# Patient Record
Sex: FEMALE | Race: ASIAN | URBAN - METROPOLITAN AREA
[De-identification: names, ages, dates, MRNs, and addresses within clinical notes are randomized per-mention and may not be internally consistent; named-entity substitution may affect disease eponyms.]

---

## 2017-01-05 ENCOUNTER — TELEPHONE (OUTPATIENT)
Dept: FAMILY MEDICINE | Facility: CLINIC | Age: 57
End: 2017-01-05

## 2017-01-05 NOTE — TELEPHONE ENCOUNTER
Prior Authorization Retail Medication Request  Medication/Dose: hydrocortisone (ANUSOL-HC) 25 MG suppository (Hydrocortisone 25mg supp)  Diagnosis and ICD code: External hemorrhoids [K64.4]   New/Renewal/Insurance Change PA:   Previously Tried and Failed Therapies:     Insurance ID (if provided): [id number] - [employer]  Insurance Phone (if provided): [phone]    Any additional info from fax request:   BIN  [id number]  [facility]  Group  [id number]    If you received a fax notification from an outside Pharmacy:  Pharmacy Name:[facility] [id number]  Pharmacy #:[phone]  Pharmacy Fax:[phone]

## 2017-02-10 DIAGNOSIS — K59.09 CHRONIC CONSTIPATION: Primary | ICD-10-CM

## 2017-02-10 RX ORDER — ASPIRIN 81 MG
100 TABLET, DELAYED RELEASE (ENTERIC COATED) ORAL 3 TIMES DAILY PRN
Qty: 90 TABLET | Refills: 8 | Status: SHIPPED | OUTPATIENT
Start: 2017-02-10 | End: 2017-08-08

## 2017-02-10 NOTE — TELEPHONE ENCOUNTER
Date of last visit at clinic: 11/08/2016    Please complete refill and CLOSE ENCOUNTER.  Closing the encounter signifies the refill is complete.

## 2017-03-14 NOTE — TELEPHONE ENCOUNTER
Prior Authorization: Approved    Approved as of: 03/08/2017 through 03/08/2018    Pharmacy notified.  Routing to MD Alexandrea Chavis March 14, 2017 at 9:28 AM

## 2017-07-05 DIAGNOSIS — R09.81 NASAL CONGESTION: ICD-10-CM

## 2017-07-05 RX ORDER — FLUTICASONE PROPIONATE 50 MCG
1-2 SPRAY, SUSPENSION (ML) NASAL DAILY
Qty: 16 G | Refills: 1 | Status: SHIPPED | OUTPATIENT
Start: 2017-07-05 | End: 2018-03-30

## 2017-07-05 NOTE — TELEPHONE ENCOUNTER
Date of last visit at clinic: 11/8/16    Please complete refill and CLOSE ENCOUNTER.  Closing the encounter signifies the refill is complete.

## 2017-07-20 ENCOUNTER — TRANSFERRED RECORDS (OUTPATIENT)
Dept: HEALTH INFORMATION MANAGEMENT | Facility: CLINIC | Age: 57
End: 2017-07-20

## 2017-08-08 ENCOUNTER — OFFICE VISIT (OUTPATIENT)
Dept: FAMILY MEDICINE | Facility: CLINIC | Age: 57
End: 2017-08-08

## 2017-08-08 ENCOUNTER — OFFICE VISIT (OUTPATIENT)
Dept: PHARMACY | Facility: CLINIC | Age: 57
End: 2017-08-08

## 2017-08-08 VITALS
SYSTOLIC BLOOD PRESSURE: 96 MMHG | RESPIRATION RATE: 16 BRPM | OXYGEN SATURATION: 98 % | TEMPERATURE: 97.6 F | HEIGHT: 62 IN | HEART RATE: 78 BPM | DIASTOLIC BLOOD PRESSURE: 65 MMHG | BODY MASS INDEX: 17.48 KG/M2 | WEIGHT: 95 LBS

## 2017-08-08 DIAGNOSIS — R63.6 UNDERWEIGHT: ICD-10-CM

## 2017-08-08 DIAGNOSIS — G47.00 INSOMNIA, UNSPECIFIED TYPE: ICD-10-CM

## 2017-08-08 DIAGNOSIS — K59.09 CHRONIC CONSTIPATION: ICD-10-CM

## 2017-08-08 DIAGNOSIS — K64.4 EXTERNAL HEMORRHOIDS: ICD-10-CM

## 2017-08-08 DIAGNOSIS — Z00.00 ROUTINE GENERAL MEDICAL EXAMINATION AT A HEALTH CARE FACILITY: ICD-10-CM

## 2017-08-08 DIAGNOSIS — E55.9 VITAMIN D DEFICIENCY: Primary | ICD-10-CM

## 2017-08-08 DIAGNOSIS — Z78.0 POSTMENOPAUSAL STATUS: ICD-10-CM

## 2017-08-08 DIAGNOSIS — H04.123 DRY EYES: Primary | ICD-10-CM

## 2017-08-08 LAB
HBA1C MFR BLD: 4.7 % (ref 4.1–5.7)
TSH SERPL DL<=0.005 MIU/L-ACNC: 1.32 MU/L (ref 0.4–4)

## 2017-08-08 RX ORDER — TRAZODONE HYDROCHLORIDE 50 MG/1
50 TABLET, FILM COATED ORAL
Qty: 30 TABLET | Refills: 3 | Status: SHIPPED | OUTPATIENT
Start: 2017-08-08 | End: 2018-10-25

## 2017-08-08 RX ORDER — ASPIRIN 81 MG
100 TABLET, DELAYED RELEASE (ENTERIC COATED) ORAL 2 TIMES DAILY PRN
Qty: 60 TABLET | Refills: 11 | Status: SHIPPED | OUTPATIENT
Start: 2017-08-08 | End: 2018-10-25

## 2017-08-08 RX ORDER — HYDROCORTISONE ACETATE 25 MG/1
25 SUPPOSITORY RECTAL 2 TIMES DAILY PRN
Qty: 28 SUPPOSITORY | Refills: 3 | Status: SHIPPED | OUTPATIENT
Start: 2017-08-08 | End: 2018-10-25

## 2017-08-08 RX ORDER — CARBOXYMETHYLCELLULOSE SODIUM 5 MG/ML
1 SOLUTION/ DROPS OPHTHALMIC DAILY PRN
Qty: 1 BOTTLE | Refills: 11 | Status: SHIPPED | OUTPATIENT
Start: 2017-08-08 | End: 2019-04-18

## 2017-08-08 RX ORDER — HYDROCORTISONE 2.5 %
CREAM (GRAM) TOPICAL
COMMUNITY
End: 2021-10-13

## 2017-08-08 NOTE — LETTER
August 9, 2017      Bridgette Carl  [address] [place] [address] [address] [place] [id number]        Dear Bridgette,    Thank you for getting your care at [facility]. Please see below for your test results.    Resulted Orders   Vitamin D Deficiency   Result Value Ref Range    Vitamin D Deficiency screening 8 (L) 20 - 75 ug/L      Comment:      Season, race, dietary intake, and treatment affect the concentration of   25-hydroxy-Vitamin D. Values may decrease during winter months and increase   during summer months. Values 20-29 ug/L may indicate Vitamin D insufficiency   and values <20 ug/L may indicate Vitamin D deficiency.   Vitamin D determination is routinely performed by an immunoassay specific for   25 hydroxyvitamin D3.  If an individual is on vitamin D2 (ergocalciferol)   supplementation, please specify 25 OH vitamin D2 and D3 level determination   by   LCMSMS test VITD23.     Hemoglobin A1c (LabDAQ)   Result Value Ref Range    Hemoglobin A1C 4.7 4.1 - 5.7 %   TSH with free T4 reflex   Result Value Ref Range    TSH 1.32 0.40 - 4.00 mU/L       Your test results are good except your vitamin D level is very low. I have sent a prescription to your pharmacy for high dose vitamin D that you should take every week for 16 weeks.    Sincerely,    Erin Grant MD

## 2017-08-08 NOTE — NURSING NOTE
used this visit:  Name: Paco Dang  Language: Cantonease  Agency: [facility]   Phone:[phone]  Radha Gudino

## 2017-08-08 NOTE — MR AVS SNAPSHOT
After Visit Summary   2017    Bridgette Carl    MRN: [id number]           Patient Information     Date Of Birth          1960        Visit Information        Provider Department      2017 2:40 PM Manasa Espinoza, [facility]        Today's Diagnoses     Dry eyes    -  1    Postmenopausal status        Chronic constipation        External hemorrhoids        Insomnia, unspecified type           Follow-ups after your visit        Who to contact     Please call your clinic at [phone] to:    Ask questions about your health    Make or cancel appointments    Discuss your medicines    Learn about your test results    Speak to your doctor   If you have compliments or concerns about an experience at your clinic, or if you wish to file a complaint, please contact [facility] Patient Relations at [phone] or email us at [email or web address]         Additional Information About Your Visit        MyChart Information     Newsana is an electronic gateway that provides easy, online access to your medical records. With Newsana, you can request a clinic appointment, read your test results, renew a prescription or communicate with your care team.     To sign up for [employer] visit the website at [email or web address]   You will be asked to enter the access code listed below, as well as some personal information. Please follow the directions to create your username and password.     Your access code is: [email or web address]  Expires: 2017  3:37 PM     Your access code will  in 90 days. If you need help or a new code, please contact your [facility] or call [phone] for assistance.        Care EveryWhere ID     This is your Care EveryWhere ID. This could be used by other organizations to access your [place] medical records  [id number]         Blood Pressure from Last 3 Encounters:   17 96/65    11/08/16 103/67   10/17/16 98/68    Weight from Last 3 Encounters:   08/08/17 95 lb (43.1 kg)   11/08/16 93 lb 9.6 oz (42.5 kg)   10/17/16 96 lb (43.5 kg)              Today, you had the following     No orders found for display         Today's Medication Changes          These changes are accurate as of: 8/8/17 11:59 PM.  If you have any questions, ask your nurse or doctor.               Start taking these medicines.        Dose/Directions    carboxymethylcellulose 0.5 % Soln ophthalmic solution   Commonly known as:  REFRESH PLUS   Used for:  Dry eyes   Started by:  Manasa Espinoza RPH        Dose:  1 drop   Place 1 drop into both eyes daily as needed for dry eyes   Quantity:  1 Bottle   Refills:  11         These medicines have changed or have updated prescriptions.        Dose/Directions    conjugated estrogens cream   Commonly known as:  PREMARIN   This may have changed:    - how much to take  - how to take this  - when to take this  - additional instructions   Used for:  Postmenopausal status   Changed by:  Manasa Espinoza RPH        Dose:  0.5 g   Place 0.5 g vaginally twice a week As needed for vaginal dryness.   Quantity:  30 g   Refills:  3       docusate sodium 100 MG tablet   Commonly known as:  COLACE   This may have changed:  when to take this   Used for:  Chronic constipation   Changed by:  Manasa Espinoza RPH        Dose:  100 mg   Take 100 mg by mouth 2 times daily as needed for constipation   Quantity:  60 tablet   Refills:  11       hydrocortisone 25 MG Suppository   Commonly known as:  ANUSOL-HC   This may have changed:    - when to take this  - reasons to take this   Used for:  External hemorrhoids   Changed by:  Manasa Espinoza RPH        Dose:  25 mg   Place 1 suppository (25 mg) rectally 2 times daily as needed for hemorrhoids   Quantity:  28 suppository   Refills:  3       traZODone 50 MG tablet   Commonly known as:  DESYREL   This may have changed:    - how much to take  - how to  take this  - when to take this  - reasons to take this  - additional instructions   Used for:  Insomnia, unspecified type   Changed by:  Manasa Espinoza, [facility]        Dose:  50 mg   Take 1 tablet (50 mg) by mouth nightly as needed for sleep   Quantity:  30 tablet   Refills:  3            Where to get your medicines      These medications were sent to [facility] - [address]  [address] [facility] [address]     Phone:  [phone]     carboxymethylcellulose 0.5 % Soln ophthalmic solution    conjugated estrogens cream    docusate sodium 100 MG tablet    hydrocortisone 25 MG Suppository    traZODone 50 MG tablet                Primary Care Provider Office Phone # Fax #    Erin Grant [phone]       [address] [address]        Equal Access to Services     [place] : Amanda fonseca Socaridad, waaxda luqadaha, qaybta kaalmada myesha, dee dee acevedo . So [facility] [phone].    ATENCIÓN: Si habla español, tiene a carvajal disposición servicios gratuitos de asistencia lingüística. Ly al [phone].    We comply with applicable federal civil rights laws and [place] laws. We do not discriminate on the basis of race, color, national origin, age, disability sex, sexual orientation or gender identity.            Thank you!     Thank you for choosing [facility]  for your care. Our goal is always to provide you with excellent care. Hearing back from our patients is one way we can continue to improve our services. Please take a few minutes to complete the written survey that you may receive in the mail after your visit with us. Thank you!             Your Updated Medication List - Protect others around you: Learn how to safely use, store and throw away your medicines at www.disposemymeds.org.          This list is accurate as of: 8/8/17 11:59 PM.  Always use your most recent med  list.                   Brand Name Dispense Instructions for use Diagnosis    acetaminophen 500 MG tablet    TYLENOL    100 tablet    Take 1-2 tablets (500-1,000 mg) by mouth every 6 hours as needed for mild pain    Scoliosis (and kyphoscoliosis), idiopathic       calcium carbonate-vitamin D 500-400 MG-UNIT Tabs per tablet     180 tablet    Take 1 tablet by mouth 2 times daily    Routine general medical examination at a health care facility       carboxymethylcellulose 0.5 % Soln ophthalmic solution    REFRESH PLUS    1 Bottle    Place 1 drop into both eyes daily as needed for dry eyes    Dry eyes       conjugated estrogens cream    PREMARIN    30 g    Place 0.5 g vaginally twice a week As needed for vaginal dryness.    Postmenopausal status       diphenhydrAMINE 25 MG tablet    BENADRYL    60 tablet    Take 1-2 tablets (25-50 mg) by mouth every 6 hours as needed for itching or allergies    Dry skin       docusate sodium 100 MG tablet    COLACE    60 tablet    Take 100 mg by mouth 2 times daily as needed for constipation    Chronic constipation       eucerin cream     240 g    Apply topically as needed for dry skin or itching    Dry skin       fluticasone 50 MCG/ACT spray    FLONASE    16 g    Spray 1-2 sprays into both nostrils daily    Nasal congestion       * hydrocortisone 2.5 % cream      Apply topically every night before bed for hemorrhoids        * hydrocortisone 1 % lotion     113 mL    Apply topically 2 times daily    Dermatitis       hydrocortisone 25 MG Suppository    ANUSOL-HC    28 suppository    Place 1 suppository (25 mg) rectally 2 times daily as needed for hemorrhoids    External hemorrhoids       olopatadine 0.1 % ophthalmic solution    PATANOL    1 Bottle    Place 1 drop into both eyes 2 times daily    Chronic allergic conjunctivitis       order for DME     1 Units    Equipment being ordered: donut cushion    Sacral pain       traZODone 50 MG tablet    DESYREL    30 tablet    Take 1 tablet (50  mg) by mouth nightly as needed for sleep    Insomnia, unspecified type       Witch Hazel Pads     1 each    Use prn bowel movements    Constipation       * Notice:  This list has 2 medication(s) that are the same as other medications prescribed for you. Read the directions carefully, and ask your doctor or other care provider to review them with you.

## 2017-08-08 NOTE — PROGRESS NOTES
Clinical Pharmacy Note     Bridgette was referred by Dr. Grant for pharmacy services for MTM.    MEDICATION REVIEW:  Discussed all medication indications, dosage and effectiveness, adverse effects, and adherence with patient/caregiver.    Pt had meds with them: no  Pt had med list with them: no  Pt was knowledgeable about meds: no, sister Keyla was knowledgeable about medications.  Medications set up by: Keyla goel  Medications administered by someone else (e.g., LTCF): Yes: Keyla goel    Medication Discrepancies  Medications on EMR med list that pt is NOT taking:  yes, guaifenesin and codeine, citrucel, oxybutynin, piroxicam, calcium + vitamin D  Medications pt IS taking that are NOT on EMR med list (e.g., from specialist, hospital): yes, using proctozone-hc for hemorrhoids.  OTC meds/ dietary supplements pt taking on own that are NOT on EMR med list:  none  Dosage listed differently than how patient is taking: yes, trazodone - taking 1/2 tablet some days and 1 tablet other days  Frequency listed differently than how patient is taking: docusate - patient is taking twice daily, not 3 times.  Duplicate medication on list (two occurrences of the same medication):  none  TOTAL NUMBER OF MEDICATION DISCREPANCIES:  9  Medications addressed: 14  ______________________________________________________________________    Subjective:  Bridgette is here today for medication refills and with questions about her medications.  She brings with her May, her sister who communicates for her.  A  was also used for the visit.    1)   Insomnia;     Patient states that the trazodone is not always working to help her fall asleep.  She states that the directions are to take 1/2 tablet daily but that she has increased this to 1 full tablet (maybe 4 days/week) which has been working better.  When asked about the time of administration, May states that she gives her the medication sometimes at 8p, sometimes 9p and sometimes 11p -  "pretty inconsistent.  May eats dinner around 6pm and goes to sleep around 12midnight regularly.    2)  Postmenopausal Status Bone Health;    Patient decided not to take her calcium + vitamin D supplement anymore.  When asked why she doesn't take it, May states \"because she does not make decisions like a normal person.\"  The patient also states that she is concerned about getting a kidney stone if she uses calcium supplements.  She denies history of kidney stones.    When asked about dietary calcium intake, patient states that she does not drink milk very often but that she could try to drink more and she also likes yogurt.  States she might be getting calcium through spinach.    May asks about using just a vitamin D supplement and getting calcium through the diet.    3)   Dry Eyes    Patient is using patanol twice daily almost every day and is requesting a refill.  When asked about allergens, she first denies allergens and states her eyes are red and dry.  Sometimes when she goes outside her eyes are irritated.      She notes that her eyes are also dry when she is in the house.  She denies any pets in the home.      Patient requests refills of docusate, premarin and hct suppositories.  Patient reported being compliant most of the time   Patient reports no side effects.    Objective:    No Ca or Vitamin D level since 2014  Vitamin D <13  Ca 9.7 (RR 8.5 to 10.4)    There were no vitals taken for this visit.  BP Readings from Last 6 Encounters:   08/08/17 96/65   11/08/16 103/67   10/17/16 98/68   06/03/16 98/64   05/05/16 104/61   04/06/16 116/75     CrCl cannot be calculated (Patient's most recent sCr result is older than the maximum 90 days allowed.).  GFR Estimate   Date Value Ref Range Status   06/13/2014 >90 >60 mL/min/1.7m2 Final   10/10/2013 >90 >60 mL/min/1.7m2 Final     GFR Estimate If Black   Date Value Ref Range Status   06/13/2014 >90 >60 mL/min/1.7m2 Final   10/10/2013 >90 >60 mL/min/1.7m2 Final     Drug " Therapy Assessment:  Drug therapy problems identified:     1. Insomnia, unspecified type       Status:  uncontrolled       DTP:  Dosage Too Low: dose too low  , DTP degree:2            Patient is experiencing continued insomnia with trazodone 25mg dose.  It is appropriate to increase the dose to 50 mg nightly as needed at this time.  Administration has not been with food and is recommended to remain without food to optimize peak effect within 1 hour.  Timing of administration of medication had been inconsistent in relation to bedtime.  Recommend to wait to give dose until 30-60 minutes before intentions of going to sleep.    Prescription was sent:  - traZODone (DESYREL) 50 MG tablet; Take 1 tablet (50 mg) by mouth nightly as needed for sleep  Dispense: 30 tablet; Refill: 3    2. Postmenopausal status/Bone Health       Status:  Uncontrolled, patient not meeting recommended Ca daily intake.       DTP:  patient prefers not to take  , DTP degree:2            Bridgette has decided to stop taking her Calcium + Vitamin D supplement and she does not regularly eat dairy products.  Her dietary calcium intake is probably ~250 mg/day from non-dairy food sources.  She is not meeting daily intake recommendations for National Osteoporosis Foundation of 1200 mg Ca/800 IU Vitamin D daily for post-menopausal women age 50+.      She is resistant to start taking a supplement again because of fear of kidney stones.  Patient does not have other medical conditions that are potential risk factors for kidney stones such as obesity, diabetes or gout.  Patient does not have a history of kidney stones and I am not concerned about her being high risk.  There may be an association between calcium supplements and increased risk of kidney stones, but a diet high in calcium has been shown to have the opposite effect.  Because patient is unwilling to take the supplement at this time, recommend increasing dietary calcium.    We had a discussion about the  recommendation to increase dietary calcium intake: eat more dairy products such as cheese, yogurt and milk and continue to eat spinach.  Patient could supplement with Vitamin D alone rather than with the combo tablet.    Recommend draw calcium and vitamin D labs to assess patient current state.  Consider initiating Vitamin D 800 IU/day based on lab results.    3. Dry eyes       Status:  uncontrolled       DTP:  Needs Additional Therapy: synergistic therapy  , DTP degree:2    Patient seems to be experiencing dry irritated eyes.  She does have a history of cataracts.  Patient is currently using patanol for dry, red irritated eyes.  It is unclear whether the irritation is allergic related or simply dry eyes.  It is appropriate to trial using artificial tears at this time because the irritation may be dry eyes, rather than a histamine-mediated process.  If redness, itching and irritation continue, then patanol is appropriate to continue.  - carboxymethylcellulose (REFRESH PLUS) 0.5 % SOLN ophthalmic solution; Place 1 drop into both eyes daily as needed for dry eyes  Dispense: 1 Bottle; Refill: 11      Refill prescription doses and instructions were verified with Dr. Grant  - hydrocortisone (ANUSOL-HC) 25 MG Suppository; Place 1 suppository (25 mg) rectally 2 times daily as needed for hemorrhoids  Dispense: 28 suppository; Refill: 3  - conjugated estrogens (PREMARIN) cream; Place 0.5 g vaginally twice a week As needed for vaginal dryness.  Dispense: 30 g; Refill: 3.  - docusate sodium (COLACE) 100 MG tablet; Take 100 mg by mouth 2 times daily as needed for constipation  Dispense: 60 tablet; Refill: 11    All medications were reviewed and found to be indicated, effective, safe and convenient unless drug therapy problem identified as described above.        Drug Therapy Plan and Follow up:    Plan    1. Start taking trazodone 50 mg at bedtime as needed for insomnia.  2. Increase dietary calcium with dairy  products.  3. Draw calcium and vitamin D labs.  4. Trial artificial tears when eyes are dry.    Follow up: with PCP  Patient was provided with written instructions/medication list via provider AVS.    Options for treatment and/or follow-up care were reviewed with the patient. Patient was engaged and actively involved in the decision making process.  Bridgette Carl verbalized understanding of the options discussed and was satisfied with the final plan.       Dr. Grant was provided my action  in clinic today and Dr. Grant was available for supervision during this visit and is the authorizing prescriber for this visit through the pharmacist collaborative practice agreement.    Manasa Espinoza, Pharm.D      Total Time: 30  # DTPs Identified: 3    Medical Condition 1: Insomnia, Goals of therapy: Not at goal, Drug Class: Antidepressant,  , Efficacy: Partially effective,  ,  , Intervention: Change dose, Verification: Patient Agreed - CPA  Medical Condition 2: Other vitamin deficiencies, Goals of therapy 2: Other, Drug Class 2: Vitamins,  ,  ,  , Convenience 2: Patient prefers not to take, Intervention 2: Educate patient, Verification 2: Patient Agreed - CPA  Medical Condition 3: Other, Goals of therapy 3: Not at goal, Drug Class 3: Other, Indication 3: Needs additional drug therapy,  ,  ,  , Intervention 3: Initiate drug, Verification 3: Patient Agreed - CPA   ,  ,  ,

## 2017-08-08 NOTE — MR AVS SNAPSHOT
After Visit Summary   2017    Bridgette Carl    MRN: [id number]           Patient Information     Date Of Birth          1960        Visit Information        Provider Department      2017 2:20 PM Erin Grant MD [facility]        Today's Diagnoses     Vitamin D deficiency    -  1    Routine general medical examination at a health care facility        Underweight           Follow-ups after your visit        Who to contact     Please call your clinic at [phone] to:    Ask questions about your health    Make or cancel appointments    Discuss your medicines    Learn about your test results    Speak to your doctor   If you have compliments or concerns about an experience at your clinic, or if you wish to file a complaint, please contact [facility] Patient Relations at [phone] or email us at [email or web address]         Additional Information About Your Visit        MyChart Information     [employer] is an electronic gateway that provides easy, online access to your medical records. With [employer], you can request a clinic appointment, read your test results, renew a prescription or communicate with your care team.     To sign up for Leetchit visit the website at [email or web address]   You will be asked to enter the access code listed below, as well as some personal information. Please follow the directions to create your username and password.     Your access code is: [email or web address]  Expires: 2017  3:37 PM     Your access code will  in 90 days. If you need help or a new code, please contact your [facility] or call [phone] for assistance.        Care EveryWhere ID     This is your Care EveryWhere ID. This could be used by other organizations to access your [place] medical records  [id number]        Your Vitals Were     Pulse Temperature Respirations Height Pulse Oximetry  "BMI (Body Mass Index)    78 97.6  F (36.4  C) (Oral) 16 5' 1.54\" (156.3 cm) 98% 17.64 kg/m2       Blood Pressure from Last 3 Encounters:   08/08/17 96/65   11/08/16 103/67   10/17/16 98/68    Weight from Last 3 Encounters:   08/08/17 95 lb (43.1 kg)   11/08/16 93 lb 9.6 oz (42.5 kg)   10/17/16 96 lb (43.5 kg)              We Performed the Following     Hemoglobin A1c (LabDAQ)     TSH with free T4 reflex     Vitamin D Deficiency          Today's Medication Changes          These changes are accurate as of: 8/8/17  3:40 PM.  If you have any questions, ask your nurse or doctor.               These medicines have changed or have updated prescriptions.        Dose/Directions    docusate sodium 100 MG tablet   Commonly known as:  COLACE   This may have changed:  additional instructions   Used for:  Chronic constipation        Dose:  100 mg   Take 100 mg by mouth 3 times daily as needed for constipation   Quantity:  90 tablet   Refills:  8                Primary Care Provider Office Phone # Fax #    Erin Grant [phone]       [address] [address]        Equal Access to Services     [place] [facility]: Amanda acuñao Susie, watresda luamena, qaybta kaalmada adeopalda, dee dee johnson. So [facility] [phone].    ATENCIÓN: Si habla español, tiene a carvajal disposición servicios gratuitos de asistencia lingüística. Llcherri al [phone].    We comply with applicable federal civil rights laws and [place] laws. We do not discriminate on the basis of race, color, national origin, age, disability sex, sexual orientation or gender identity.            Thank you!     [facility] you for [facility]  for your care. Our goal is always to provide you with excellent care. Hearing back from our patients is one way we can continue to improve our services. Please take a few minutes to complete the written survey that you may receive " in the mail after your visit with us. Thank you!             Your Updated Medication List - Protect others around you: Learn how to safely use, store and throw away your medicines at www.disposemymeds.org.          This list is accurate as of: 8/8/17  3:40 PM.  Always use your most recent med list.                   Brand Name Dispense Instructions for use Diagnosis    acetaminophen 500 MG tablet    TYLENOL    100 tablet    Take 1-2 tablets (500-1,000 mg) by mouth every 6 hours as needed for mild pain    Scoliosis (and kyphoscoliosis), idiopathic       calcium carbonate-vitamin D 500-400 MG-UNIT Tabs per tablet     180 tablet    Take 1 tablet by mouth 2 times daily    Routine general medical examination at a health care facility       conjugated estrogens cream    PREMARIN    30 g    Use as needed for vaginal dryness and itching    Postmenopausal status       diphenhydrAMINE 25 MG tablet    BENADRYL    60 tablet    Take 1-2 tablets (25-50 mg) by mouth every 6 hours as needed for itching or allergies    Dry skin       docusate sodium 100 MG tablet    COLACE    90 tablet    Take 100 mg by mouth 3 times daily as needed for constipation    Chronic constipation       eucerin cream     240 g    Apply topically as needed for dry skin or itching    Dry skin       fluticasone 50 MCG/ACT spray    FLONASE    16 g    Spray 1-2 sprays into both nostrils daily    Nasal congestion       * hydrocortisone 2.5 % cream      Apply topically every night before bed for hemorrhoids        * hydrocortisone 1 % lotion     113 mL    Apply topically 2 times daily    Dermatitis       hydrocortisone 25 MG Suppository    ANUSOL-HC    28 suppository    Place 1 suppository (25 mg) rectally 2 times daily    External hemorrhoids       olopatadine 0.1 % ophthalmic solution    PATANOL    1 Bottle    Place 1 drop into both eyes 2 times daily    Chronic allergic conjunctivitis       order for DME     1 Units    Equipment being ordered: donut cushion     Sacral pain       traZODone 50 MG tablet    DESYREL    45 tablet    Take 1/2 tablet as needed at bedtime    Insomnia, unspecified       Witch Hazel Pads     1 each    Use prn bowel movements    Constipation       * Notice:  This list has 2 medication(s) that are the same as other medications prescribed for you. Read the directions carefully, and ask your doctor or other care provider to review them with you.

## 2017-08-09 DIAGNOSIS — E55.9 VITAMIN D DEFICIENCY: Primary | ICD-10-CM

## 2017-08-09 LAB — DEPRECATED CALCIDIOL+CALCIFEROL SERPL-MC: 8 UG/L (ref 20–75)

## 2017-08-09 NOTE — PROGRESS NOTES
SUBJECTIVE:  The patient is here for a routine exam.  We went through the healthcare maintenance items.  The only thing she is due for is a mammogram, but she declines that.  She has never been sexually active, so we have not done Pap and pelvic exams with her, either.  She did fill out the health history form.  With review of systems, the 20+ items on the form, she did have things positive for change in bowel habits, pain in the muscles and joints, numbness in the legs and bleeding; I think that is with her hemorrhoids.  Actually, her problems are all actually very stable as long as she takes her medications.  Probably the biggest thing has been the hemorrhoids, and she has seen Colorectal Surgery about those, and again as long she takes her meds, she is okay.  The patient is accompanied by her sister and an .  I had PharmD see them since there were a lot of issues related to the meds.  They wanted to switch her pharmacy to [facility].  The sister wanted some blood tests checked.  She felt that her sister maybe had lost a little bit of weight and maybe was a little bit more tired.  Looking in the chart, there really was not a sign of weight loss.  She has been low on vitamin D in the past, so I wanted to check that.  Because of the sister's concern, I did go ahead and check a TSH.  There had been some question about her being able to get Anusol suppositories, which they felt worked the best.  She did have a prior authorization for this that went through 03/2018.      OBJECTIVE:  On exam, the patient is in no acute distress.  Vital signs are stable.  TMs were normal.  Pupils equally round and reactive to light.  Fundi benign.  Pharynx unremarkable.  No cervical adenopathy.  Heart has a regular rate and rhythm without murmurs.  Lungs are clear.  Breasts without masses or adenopathy.  Abdomen is soft and nontender without masses.  Patellar reflexes are symmetric.      IMPRESSION:  Stable medical  problems.      PLAN:  I checked vitamin D and TSH reflex.  The pharmacy talked with the patient about moving the meds over and refilling what needed to be done.

## 2017-08-11 NOTE — PROGRESS NOTES
===================    Pharmacy Attestation Statement:    Patient s case reviewed. I agree with the written assessment and plan of care.    Luis Miguel Herron PharmD.    ===================

## 2017-12-01 ENCOUNTER — OFFICE VISIT (OUTPATIENT)
Dept: FAMILY MEDICINE | Facility: CLINIC | Age: 57
End: 2017-12-01

## 2017-12-01 VITALS
SYSTOLIC BLOOD PRESSURE: 101 MMHG | OXYGEN SATURATION: 97 % | DIASTOLIC BLOOD PRESSURE: 64 MMHG | TEMPERATURE: 98.8 F | RESPIRATION RATE: 16 BRPM | HEART RATE: 92 BPM | WEIGHT: 94.6 LBS | BODY MASS INDEX: 17.57 KG/M2

## 2017-12-01 DIAGNOSIS — R05.9 COUGH: Primary | ICD-10-CM

## 2017-12-01 RX ORDER — GUAIFENESIN/DEXTROMETHORPHAN 100-10MG/5
5 SYRUP ORAL EVERY 4 HOURS PRN
Qty: 236 ML | Refills: 0 | Status: SHIPPED | OUTPATIENT
Start: 2017-12-01 | End: 2019-07-17

## 2017-12-01 NOTE — MR AVS SNAPSHOT
After Visit Summary   2017    Bridgette Carl    MRN: [id number]           Patient Information     Date Of Birth          1960        Visit Information        Provider Department      2017 10:40 AM Erin Grant MD [facility]        Today's Diagnoses     Cough    -  1       Follow-ups after your visit        Who to contact     Please call your clinic at [phone] to:    Ask questions about your health    Make or cancel appointments    Discuss your medicines    Learn about your test results    Speak to your doctor   If you have compliments or concerns about an experience at your clinic, or if you wish to file a complaint, please contact [facility] Patient Relations at [phone] or email us at [email or web address]         Additional Information About Your Visit        MyChart Information     [employer] is an electronic gateway that provides easy, online access to your medical records. With [employer], you can request a clinic appointment, read your test results, renew a prescription or communicate with your care team.     To sign up for V-Keyt visit the website at [email or web address]   You will be asked to enter the access code listed below, as well as some personal information. Please follow the directions to create your username and password.     Your access code is: [email or web address]  Expires: 3/1/2018 11:01 AM     Your access code will  in 90 days. If you need help or a new code, please contact your [facility] or call [phone] for assistance.        Care EveryWhere ID     This is your Care EveryWhere ID. This could be used by other organizations to access your [place] medical records  [id number]        Your Vitals Were     Pulse Temperature Respirations Pulse Oximetry BMI (Body Mass Index)       92 98.8  F (37.1  C) (Oral) 16 97% 17.57 kg/m2        Blood Pressure from Last 3  Encounters:   12/01/17 101/64   08/08/17 96/65   11/08/16 103/67    Weight from Last 3 Encounters:   12/01/17 94 lb 9.6 oz (42.9 kg)   08/08/17 95 lb (43.1 kg)   11/08/16 93 lb 9.6 oz (42.5 kg)              Today, you had the following     No orders found for display         Today's Medication Changes          These changes are accurate as of: 12/1/17 11:01 AM.  If you have any questions, ask your nurse or doctor.               Start taking these medicines.        Dose/Directions    guaiFENesin-dextromethorphan 100-10 MG/5ML syrup   Commonly known as:  ROBITUSSIN DM   Used for:  Cough   Started by:  Erin Grant MD        Dose:  5 mL   Take 5 mLs by mouth every 4 hours as needed for cough   Quantity:  236 mL   Refills:  0            Where to get your medicines      These medications were sent to [facility] [address] [facility]  [address] [facility] [address]     Phone:  [phone]     guaiFENesin-dextromethorphan 100-10 MG/5ML syrup                Primary Care Provider Office Phone # Fax #    Erin Grant [phone]       [address] [facility] [address] [place] [address]        Equal Access to Services     [place] [facility]: Amanda fonseca Socaridad, waaxda luqadaha, qaybta kaalmada myesha, dee dee acevedo . So [facility] [phone].    ATENCIÓN: Si habla español, tiene a carvajal disposición servicios gratuitos de asistencia lingüística. Ly al [phone].    We comply with applicable federal civil rights laws and [place] laws. We do not discriminate on the basis of race, color, national origin, age, disability, sex, sexual orientation, or gender identity.            Thank you!     Thank you for choosing [facility]  for your care. Our goal is always to provide you with excellent care. Hearing back from our patients is one way we can continue to improve our services. Please take a few minutes to  complete the written survey that you may receive in the mail after your visit with us. Thank you!             Your Updated Medication List - Protect others around you: Learn how to safely use, store and throw away your medicines at www.disposemymeds.org.          This list is accurate as of: 12/1/17 11:01 AM.  Always use your most recent med list.                   Brand Name Dispense Instructions for use Diagnosis    acetaminophen 500 MG tablet    TYLENOL    100 tablet    Take 1-2 tablets (500-1,000 mg) by mouth every 6 hours as needed for mild pain    Scoliosis (and kyphoscoliosis), idiopathic       calcium carbonate-vitamin D 500-400 MG-UNIT Tabs per tablet     180 tablet    Take 1 tablet by mouth 2 times daily    Routine general medical examination at a health care facility       carboxymethylcellulose 0.5 % Soln ophthalmic solution    REFRESH PLUS    1 Bottle    Place 1 drop into both eyes daily as needed for dry eyes    Dry eyes       cholecalciferol 81200 UNITS capsule    VITAMIN D3    16 capsule    Take 1 capsule (50,000 Units) by mouth once a week    Vitamin D deficiency       conjugated estrogens cream    PREMARIN    30 g    Place 0.5 g vaginally twice a week As needed for vaginal dryness.    Postmenopausal status       diphenhydrAMINE 25 MG tablet    BENADRYL    60 tablet    Take 1-2 tablets (25-50 mg) by mouth every 6 hours as needed for itching or allergies    Dry skin       docusate sodium 100 MG tablet    COLACE    60 tablet    Take 100 mg by mouth 2 times daily as needed for constipation    Chronic constipation       eucerin cream     240 g    Apply topically as needed for dry skin or itching    Dry skin       fluticasone 50 MCG/ACT spray    FLONASE    16 g    Spray 1-2 sprays into both nostrils daily    Nasal congestion       guaiFENesin-dextromethorphan 100-10 MG/5ML syrup    ROBITUSSIN DM    236 mL    Take 5 mLs by mouth every 4 hours as needed for cough    Cough       * hydrocortisone 2.5 % cream       Apply topically every night before bed for hemorrhoids        * hydrocortisone 1 % lotion     113 mL    Apply topically 2 times daily    Dermatitis       hydrocortisone 25 MG Suppository    ANUSOL-HC    28 suppository    Place 1 suppository (25 mg) rectally 2 times daily as needed for hemorrhoids    External hemorrhoids       olopatadine 0.1 % ophthalmic solution    PATANOL    1 Bottle    Place 1 drop into both eyes 2 times daily    Chronic allergic conjunctivitis       order for DME     1 Units    Equipment being ordered: donut cushion    Sacral pain       traZODone 50 MG tablet    DESYREL    30 tablet    Take 1 tablet (50 mg) by mouth nightly as needed for sleep    Insomnia, unspecified type       Witch Hazel Pads     1 each    Use prn bowel movements    Constipation       * Notice:  This list has 2 medication(s) that are the same as other medications prescribed for you. Read the directions carefully, and ask your doctor or other care provider to review them with you.

## 2017-12-02 NOTE — PROGRESS NOTES
SUBJECTIVE:  The patient is here with 1 week of a cough and runny nose.  She feels like it is a cold and just is requesting something for the cough.  When she was roomed, it was also mentioned that she was having some right arm pain and needed some kind of letter, but when I saw her, she was really downplaying the right arm pain.  She says it comes and goes, and she just stretches her arm, and it seems to go away.  She also said there was nothing about a form.        OBJECTIVE:  On exam, the patient is in no distress.  Vital signs are stable.  Heart has a regular rate and rhythm without murmurs.  Lungs are clear.      IMPRESSION:  Viral URI.        PLAN:  Reassurance.  I prescribed some Robitussin-DM for her.      Visit length 15 minutes, more than 50% spent in counseling about symptoms and plan.

## 2018-03-30 ENCOUNTER — OFFICE VISIT (OUTPATIENT)
Dept: FAMILY MEDICINE | Facility: CLINIC | Age: 58
End: 2018-03-30
Payer: COMMERCIAL

## 2018-03-30 VITALS
RESPIRATION RATE: 20 BRPM | TEMPERATURE: 97.7 F | HEART RATE: 72 BPM | SYSTOLIC BLOOD PRESSURE: 101 MMHG | BODY MASS INDEX: 17.6 KG/M2 | DIASTOLIC BLOOD PRESSURE: 60 MMHG | OXYGEN SATURATION: 100 % | WEIGHT: 94.8 LBS

## 2018-03-30 DIAGNOSIS — L85.3 DRY SKIN: Primary | ICD-10-CM

## 2018-03-30 DIAGNOSIS — M41.20 SCOLIOSIS (AND KYPHOSCOLIOSIS), IDIOPATHIC: ICD-10-CM

## 2018-03-30 DIAGNOSIS — R09.81 NASAL CONGESTION: ICD-10-CM

## 2018-03-30 RX ORDER — ACETAMINOPHEN 500 MG
500-1000 TABLET ORAL EVERY 6 HOURS PRN
Qty: 100 TABLET | Refills: 5 | Status: SHIPPED | OUTPATIENT
Start: 2018-03-30 | End: 2019-12-03

## 2018-03-30 RX ORDER — AMMONIUM LACTATE 12 G/100G
CREAM TOPICAL DAILY
Qty: 140 G | Refills: 11 | Status: SHIPPED | OUTPATIENT
Start: 2018-03-30 | End: 2022-01-26

## 2018-03-30 RX ORDER — FLUTICASONE PROPIONATE 50 MCG
1-2 SPRAY, SUSPENSION (ML) NASAL DAILY
Qty: 16 G | Refills: 1 | Status: SHIPPED | OUTPATIENT
Start: 2018-03-30 | End: 2018-06-26

## 2018-03-30 NOTE — MR AVS SNAPSHOT
After Visit Summary   3/30/2018    Bridgette Carl    MRN: [id number]           Patient Information     Date Of Birth          1960        Visit Information        Provider Department      3/30/2018 1:40 PM Erin Grant MD [facility]        Today's Diagnoses     Dry skin    -  1    Nasal congestion        Scoliosis           Follow-ups after your visit        Who to contact     Please call your clinic at [phone] to:    Ask questions about your health    Make or cancel appointments    Discuss your medicines    Learn about your test results    Speak to your doctor            Additional Information About Your Visit        MyChart Information     [employer] is an electronic gateway that provides easy, online access to your medical records. With [employer], you can request a clinic appointment, read your test results, renew a prescription or communicate with your care team.     To sign up for [employer] visit the website at [email or web address]   You will be asked to enter the access code listed below, as well as some personal information. Please follow the directions to create your username and password.     Your access code is: [email or web address]  Expires: 2018  2:22 PM     Your access code will  in 90 days. If you need help or a new code, please contact your [facility] or call [phone] for assistance.        Care EveryWhere ID     This is your Care EveryWhere ID. This could be used by other organizations to access your [place] medical records  [id number]        Your Vitals Were     Pulse Temperature Respirations Pulse Oximetry Breastfeeding? BMI (Body Mass Index)    72 97.7  F (36.5  C) (Oral) 20 100% No 17.6 kg/m2       Blood Pressure from Last 3 Encounters:   18 101/60   17 101/64   17 96/65    Weight from Last 3 Encounters:   18 94 lb 12.8 oz (43 kg)   17 94 lb 9.6 oz (42.9 kg)   17 95 lb  (43.1 kg)              Today, you had the following     No orders found for display         Today's Medication Changes          These changes are accurate as of 3/30/18  2:22 PM.  If you have any questions, ask your nurse or doctor.               Start taking these medicines.        Dose/Directions    ammonium lactate 12 % cream   Commonly known as:  AMLACTIN   Used for:  Dry skin   Started by:  Erin Grant MD        Apply topically daily   Quantity:  140 g   Refills:  11         These medicines have changed or have updated prescriptions.        Dose/Directions    hydrocortisone 2.5 % cream   This may have changed:  Another medication with the same name was removed. Continue taking this medication, and follow the directions you see here.   Changed by:  Erin Grant MD        Apply topically every night before bed for hemorrhoids   Refills:  0         Stop taking these medicines if you haven't already. Please contact your care team if you have questions.     diphenhydrAMINE 25 MG tablet   Commonly known as:  BENADRYL   Stopped by:  Erin Grant MD           eucerin cream   Stopped by:  Erin Grant MD                Where to get your medicines      These medications were sent to [facility] [address] AT [address] [address]     Phone:  [phone]     acetaminophen 500 MG tablet    ammonium lactate 12 % cream    fluticasone 50 MCG/ACT spray                Primary Care Provider Office Phone # Fax #    Erin Grant [phone]       [address] [address]        Equal Access to Services     [facility]: Hadii young fonseca Socaridad, waaxda luqadaha, qaybta kaalmasagrario brunner, dee dee acevedo . [facility] [phone].    ATENCIÓN: Si habla español, tiene a carvajal disposición servicios gratuitos de asistencia lingüística.  Ly webster [phone].    We comply with applicable federal civil rights laws and [place] laws. We do not discriminate on the basis of race, color, national origin, age, disability, sex, sexual orientation, or gender identity.            Thank you!     Thank you for choosing [facility]  for your care. Our goal is always to provide you with excellent care. Hearing back from our patients is one way we can continue to improve our services. Please take a few minutes to complete the written survey that you may receive in the mail after your visit with us. Thank you!             Your Updated Medication List - Protect others around you: Learn how to safely use, store and throw away your medicines at www.disposemymeds.org.          This list is accurate as of 3/30/18  2:22 PM.  Always use your most recent med list.                   Brand Name Dispense Instructions for use Diagnosis    acetaminophen 500 MG tablet    TYLENOL    100 tablet    Take 1-2 tablets (500-1,000 mg) by mouth every 6 hours as needed for mild pain    Scoliosis (and kyphoscoliosis), idiopathic       ammonium lactate 12 % cream    AMLACTIN    140 g    Apply topically daily    Dry skin       calcium carbonate-vitamin D 500-400 MG-UNIT Tabs per tablet     180 tablet    Take 1 tablet by mouth 2 times daily    Routine general medical examination at a health care facility       carboxymethylcellulose 0.5 % Soln ophthalmic solution    REFRESH PLUS    1 Bottle    Place 1 drop into both eyes daily as needed for dry eyes    Dry eyes       cholecalciferol 37474 UNITS capsule    VITAMIN D3    16 capsule    Take 1 capsule (50,000 Units) by mouth once a week    Vitamin D deficiency       conjugated estrogens cream    PREMARIN    30 g    Place 0.5 g vaginally twice a week As needed for vaginal dryness.    Postmenopausal status       docusate sodium 100 MG tablet    COLACE    60 tablet    Take 100 mg by mouth 2 times daily as needed for  constipation    Chronic constipation       fluticasone 50 MCG/ACT spray    FLONASE    16 g    Spray 1-2 sprays into both nostrils daily    Nasal congestion       guaiFENesin-dextromethorphan 100-10 MG/5ML syrup    ROBITUSSIN DM    236 mL    Take 5 mLs by mouth every 4 hours as needed for cough    Cough       hydrocortisone 2.5 % cream      Apply topically every night before bed for hemorrhoids        hydrocortisone 25 MG Suppository    ANUSOL-HC    28 suppository    Place 1 suppository (25 mg) rectally 2 times daily as needed for hemorrhoids    External hemorrhoids       olopatadine 0.1 % ophthalmic solution    PATANOL    1 Bottle    Place 1 drop into both eyes 2 times daily    Chronic allergic conjunctivitis       order for DME     1 Units    Equipment being ordered: donut cushion    Sacral pain       traZODone 50 MG tablet    DESYREL    30 tablet    Take 1 tablet (50 mg) by mouth nightly as needed for sleep    Insomnia, unspecified type       Witch Hazel Pads     1 each    Use prn bowel movements    Constipation

## 2018-06-26 DIAGNOSIS — R09.81 NASAL CONGESTION: ICD-10-CM

## 2018-06-26 RX ORDER — FLUTICASONE PROPIONATE 50 MCG
1-2 SPRAY, SUSPENSION (ML) NASAL DAILY
Qty: 16 G | Refills: 1 | Status: SHIPPED | OUTPATIENT
Start: 2018-06-26 | End: 2018-10-03

## 2018-06-26 NOTE — TELEPHONE ENCOUNTER
"Request for medication refill: fluticasone (FLONASE) 50 MCG/ACT spray    Providers if patient needs an appointment and you are willing to give a one month supply please refill for one month and  send a letter/MyChart using \".SMILLIMITEDREFILL\" .smillimited and route chart to \"P [facility] \" (Giving one month refill in non controlled medications is strongly recommended before denial)    If refill has been denied, meaning absolutely no refills without visit, please complete the smart phrase \".smirxrefuse\" and route it to the \"P SMI MED REFILLS\"  pool to inform the patient and the pharmacy.    Yarelis Thibodeaux [facility]        "

## 2018-08-09 ENCOUNTER — OFFICE VISIT (OUTPATIENT)
Dept: FAMILY MEDICINE | Facility: CLINIC | Age: 58
End: 2018-08-09
Payer: COMMERCIAL

## 2018-08-09 VITALS
BODY MASS INDEX: 17.45 KG/M2 | HEART RATE: 59 BPM | DIASTOLIC BLOOD PRESSURE: 64 MMHG | TEMPERATURE: 98.2 F | OXYGEN SATURATION: 99 % | RESPIRATION RATE: 16 BRPM | WEIGHT: 94 LBS | SYSTOLIC BLOOD PRESSURE: 108 MMHG

## 2018-08-09 DIAGNOSIS — K12.1 STOMATITIS AND MUCOSITIS: Primary | ICD-10-CM

## 2018-08-09 DIAGNOSIS — K12.30 STOMATITIS AND MUCOSITIS: Primary | ICD-10-CM

## 2018-08-09 NOTE — PROGRESS NOTES
HPI       Bridgette Carl is a 58 year old  who presents for   Chief Complaint   Patient presents with     Mouth/Lip Problem     x 2 weeks     Oral lesion - gingivostomatitis:  Buccal mucosa and lip folds  No external oral lesions noted  -Duration: 2 weeks  -Character: burning sensation   -Etiology: Possibly something she ate (deep fried) and spices  -Similar lesions in the past  -no mouth wash with poor oral hygiene   -no rash  -not sexually active  -no recent travel    Sister is interpreting for patient     +++++++      Problem, Medication and Allergy Lists were      Patient Active Problem List    Diagnosis Date Noted     Rash and nonspecific skin eruption 10/28/2015     Priority: Medium     Insect bite.       Dry skin 10/28/2015     Priority: Medium     Health Care Home 11/02/2012     Priority: Medium     Tier 1   DX V65.8 REPLACED WITH [facility]ALTH CARE HOME (04/08/2013)       Degenerative disorder of globe 11/02/2012     Priority: Medium     Problem list name updated by automated process. Provider to review       Insomnia 11/02/2012     Priority: Medium     Problem list name updated by automated process. 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     Priority: Medium     Nuclear Senile Cataract  11/02/2012     Priority: Medium     Scoliosis 11/02/2012     Priority: Medium     Underweight 11/02/2012     Priority: Medium     Osteopenia 11/02/2012     Priority: Medium   ,     Current Outpatient Prescriptions   Medication Sig Dispense Refill     acetaminophen (TYLENOL) 500 MG tablet Take 1-2 tablets (500-1,000 mg) by mouth every 6 hours as needed for mild pain 100 tablet 5     ammonium lactate (AMLACTIN) 12 % cream Apply topically daily 140 g 11     cholecalciferol (VITAMIN D3) 11312 UNITS capsule Take 1 capsule (50,000 Units) by mouth once a week 16 capsule 0     docusate sodium (COLACE) 100 MG tablet Take 100 mg by mouth 2 times daily as needed for constipation 60 tablet 11     fluticasone  (FLONASE) 50 MCG/ACT spray Spray 1-2 sprays into both nostrils daily 16 g 1     olopatadine (PATANOL) 0.1 % ophthalmic solution Place 1 drop into both eyes 2 times daily 1 Bottle 6     traZODone (DESYREL) 50 MG tablet Take 1 tablet (50 mg) by mouth nightly as needed for sleep 30 tablet 3     calcium carbonate-vitamin D 500-400 MG-UNIT TABS tablt Take 1 tablet by mouth 2 times daily (Patient not taking: Reported on 8/8/2017) 180 tablet 3     carboxymethylcellulose (REFRESH PLUS) 0.5 % SOLN ophthalmic solution Place 1 drop into both eyes daily as needed for dry eyes (Patient not taking: Reported on 3/30/2018) 1 Bottle 11     conjugated estrogens (PREMARIN) cream Place 0.5 g vaginally twice a week As needed for vaginal dryness. (Patient not taking: Reported on 3/30/2018) 30 g 3     guaiFENesin-dextromethorphan (ROBITUSSIN DM) 100-10 MG/5ML syrup Take 5 mLs by mouth every 4 hours as needed for cough (Patient not taking: Reported on 3/30/2018) 236 mL 0     hydrocortisone (ANUSOL-HC) 25 MG Suppository Place 1 suppository (25 mg) rectally 2 times daily as needed for hemorrhoids (Patient not taking: Reported on 3/30/2018) 28 suppository 3     hydrocortisone 2.5 % cream Apply topically every night before bed for hemorrhoids       ORDER FOR DME Equipment being ordered: donut cushion (Patient not taking: Reported on 3/30/2018) 1 Units 0     Witch Hazel PADS Use prn bowel movements (Patient not taking: Reported on 8/8/2017) 1 each 0   ,   No Known Allergies.    Patient is   an established patient of this clinic.  Past Medical History:   Diagnosis Date     MR (mental retardation)     states on H and P     Family History   Problem Relation Age of Onset     Anesthesia Reaction No family hx of      Colon Polyps No family hx of      Cancer - colorectal No family hx of      Ulcerative Colitis No family hx of      Crohn Disease No family hx of      Social History     Social History     Marital status: Single     Spouse name: N/A      Number of children: N/A     Years of education: N/A     Social History Main Topics     Smoking status: Never Smoker     Smokeless tobacco: Never Used     Alcohol use No     Drug use: No     Sexual activity: Not Asked     Other Topics Concern     None     Social History Narrative            Review of Systems:   Review of Systems  Skin: negative except as above  Respiratory: negative except as above  Cardiovascular: negative except as above  Gastrointestinal: negative except as above  Genitourinary: negative except as above  Musculoskeletal: negative except as above  Neurologic: negative except as above  Psychiatric: negative except as above  Hematologic/Lymphatic/Immunologic: negative except as above  Endocrine: negative except as above         Physical Exam:     Vitals:    08/09/18 1543   BP: 108/64   Pulse: 59   Resp: 16   Temp: 98.2  F (36.8  C)   TempSrc: Oral   SpO2: 99%   Weight: 94 lb (42.6 kg)     Body mass index is 17.45 kg/(m^2).  Vitals were reviewed and were normal     Physical Exam  Constitutional: Oriented to person, place, and time. Appears well-developed and well-nourished.   Mouth: small ulcer at the upper lip with no significant erythema. Mildly tender.     Neurological: Alert and oriented to person, place, and time.   Skin: Skin is warm and dry.   Psychiatric: Has a normal mood and affect. Behavior is normal.       Results:   None    Assessment and Plan        1. Aphthous Stomatitis and mucositis of mouth  Given the characteristics and location of this ulcer-like lesion, she is likely recovering from an aphthous ulcer.  She has had similar ulcers in the past intermittently according to her sister however have never included external lips or face.  This is likely to be related to stress and inflammatory state of the mucosa however is less likely to be HSV.  Patient was advised to improve her oral hygiene as this could be a contributory factor.  No indication for antiviral therapy at this  time.      Please call or return to clinic if your symptoms don't go away.    Follow up plan  Please make a clinic appointment for follow up with your primary physician Erin Grant MD as needed.     Thank you for coming to [facility] today.       There are no discontinued medications.    Options for treatment and follow-up care were reviewed with the patient. Bridgette Carl  engaged in the decision making process and verbalized understanding of the options discussed and agreed with the final plan.    Jc Perkins MD

## 2018-08-09 NOTE — PROGRESS NOTES
Preceptor Attestation:   Patient seen, evaluated and discussed with the resident. I have verified the content of the note, which accurately reflects my assessment of the patient and the plan of care.   Supervising Physician:  Nellie Diego MD

## 2018-08-10 NOTE — PATIENT INSTRUCTIONS
Here is the plan from today's visit    1. Aphthous Stomatitis and mucositis of mouth  Given the characteristics and location of this ulcer-like lesion, she is likely recovering from an aphthous ulcer.  She has had similar ulcers in the past intermittently according to her sister however have never included external lips or face.  This is likely to be related to stress and inflammatory state of the mucosa however is less likely to be HSV.  Patient was advised to improve her oral hygiene as this could be a contributory factor.  No indication for antiviral therapy at this time.      Please call or return to clinic if your symptoms don't go away.    Follow up plan  Please make a clinic appointment for follow up with your primary physician Erin Grant MD as needed.     Thank you for coming to [facility] today.  Lab Testing:  **If you had lab testing today and your results are reassuring or normal they will be mailed to you or sent through [employer] within 7 days.   **If the lab tests need quick action we will call you with the results.  The phone number we will call with results is # [phone] (home) [phone] (work). If this is not the best number please call our clinic and change the number.  Medication Refills:  If you need any refills please call your pharmacy and they will contact us.   If you need to  your refill at a new pharmacy, please contact the new pharmacy directly. The new pharmacy will help you get your medications transferred faster.   Scheduling:  If you have any concerns about today's visit or wish to schedule another appointment please call our office during normal business hours [phone] (8-5:00 M-F)  If a referral was made to a [facility] and you don't get a call from central scheduling please call [phone].  If a Mammogram was ordered for you at [facility] call [phone] to schedule or change your appointment.  If you had an  XRay/CT/Ultrasound/MRI ordered the number is [phone] to schedule or change your radiology appointment.   Medical Concerns:  If you have urgent medical concerns please call [phone] at any time of the day.    Jc Perkins MD

## 2018-09-07 ENCOUNTER — TELEPHONE (OUTPATIENT)
Dept: FAMILY MEDICINE | Facility: CLINIC | Age: 58
End: 2018-09-07

## 2018-09-07 NOTE — TELEPHONE ENCOUNTER
"Dr. Grant the Vitamin D was prescribed differently, please review and advise with correct dosing sent to pharmacy.     Request for medication refill:    Providers if patient needs an appointment and you are willing to give a one month supply please refill for one month and  send a letter/MyChart using \".SMILLIMITEDREFILL\" .smillimited and route chart to \"P [facility] \" (Giving one month refill in non controlled medications is strongly recommended before denial)    If refill has been denied, meaning absolutely no refills without visit, please complete the smart phrase \".smirxrefuse\" and route it to the \"P SMI MED REFILLS\"  pool to inform the patient and the pharmacy.    EBENEZER Friedman 10:19 AM September 7, 2018          "

## 2018-09-07 NOTE — TELEPHONE ENCOUNTER
Dr. Grant,     The only script that I saw was the same one for the 50,000IU, but the refill is stating 1.25MG daily so I was unsure what was needing to be refilled as I did not see a dosage of that.     Thank you,   EBENEZER Friedman 3:28 PM September 7, 2018

## 2018-09-07 NOTE — TELEPHONE ENCOUNTER
"Message per PCP: \"I'm confused by this message. I don't see a vitamin D prescription except from 2017\"    Message routed to original author Jaida Alfonso. Please clarify with PCP regarding prescription and pharmacy.      Mackenzie Stafford RN    "

## 2018-09-10 NOTE — TELEPHONE ENCOUNTER
Spoke with May (sister/guardian) and advised she needs to be seen if she wants to be on the Vitamin D, May will call back to schedule.   EBENEZER Friedman 8:16 AM September 10, 2018

## 2018-10-03 DIAGNOSIS — R09.81 NASAL CONGESTION: ICD-10-CM

## 2018-10-03 RX ORDER — FLUTICASONE PROPIONATE 50 MCG
1-2 SPRAY, SUSPENSION (ML) NASAL DAILY
Qty: 16 G | Refills: 1 | Status: SHIPPED | OUTPATIENT
Start: 2018-10-03 | End: 2018-12-24

## 2018-10-03 NOTE — TELEPHONE ENCOUNTER

## 2018-10-25 ENCOUNTER — OFFICE VISIT (OUTPATIENT)
Dept: FAMILY MEDICINE | Facility: CLINIC | Age: 58
End: 2018-10-25
Payer: COMMERCIAL

## 2018-10-25 VITALS
RESPIRATION RATE: 20 BRPM | DIASTOLIC BLOOD PRESSURE: 68 MMHG | HEART RATE: 67 BPM | SYSTOLIC BLOOD PRESSURE: 94 MMHG | WEIGHT: 92.5 LBS | BODY MASS INDEX: 17.18 KG/M2 | OXYGEN SATURATION: 97 % | TEMPERATURE: 97.4 F

## 2018-10-25 DIAGNOSIS — K64.4 EXTERNAL HEMORRHOIDS: ICD-10-CM

## 2018-10-25 DIAGNOSIS — E55.9 VITAMIN D DEFICIENCY: ICD-10-CM

## 2018-10-25 DIAGNOSIS — K59.09 CHRONIC CONSTIPATION: ICD-10-CM

## 2018-10-25 DIAGNOSIS — G47.00 INSOMNIA, UNSPECIFIED TYPE: ICD-10-CM

## 2018-10-25 DIAGNOSIS — L30.1 DYSHIDROTIC ECZEMA: ICD-10-CM

## 2018-10-25 RX ORDER — ASPIRIN 81 MG
100 TABLET, DELAYED RELEASE (ENTERIC COATED) ORAL 2 TIMES DAILY PRN
Qty: 60 TABLET | Refills: 11 | Status: SHIPPED | OUTPATIENT
Start: 2018-10-25 | End: 2019-04-18

## 2018-10-25 RX ORDER — TRIAMCINOLONE ACETONIDE 1 MG/G
CREAM TOPICAL
Qty: 30 G | Refills: 3 | Status: SHIPPED | OUTPATIENT
Start: 2018-10-25 | End: 2019-10-29

## 2018-10-25 RX ORDER — HYDROCORTISONE ACETATE 25 MG/1
25 SUPPOSITORY RECTAL 2 TIMES DAILY PRN
Qty: 28 SUPPOSITORY | Refills: 11 | Status: SHIPPED | OUTPATIENT
Start: 2018-10-25 | End: 2018-10-26

## 2018-10-25 RX ORDER — HYDROCORTISONE ACETATE 25 MG/1
25 SUPPOSITORY RECTAL 2 TIMES DAILY PRN
Qty: 28 SUPPOSITORY | Refills: 11 | Status: SHIPPED | OUTPATIENT
Start: 2018-10-25 | End: 2018-10-25

## 2018-10-25 RX ORDER — TRAZODONE HYDROCHLORIDE 50 MG/1
50 TABLET, FILM COATED ORAL
Qty: 30 TABLET | Refills: 11 | Status: SHIPPED | OUTPATIENT
Start: 2018-10-25 | End: 2019-10-18

## 2018-10-25 NOTE — MR AVS SNAPSHOT
After Visit Summary   10/25/2018    Bridgette Carl    MRN: [id number]           Patient Information     Date Of Birth          1960        Visit Information        Provider Department      10/25/2018 10:20 AM Erin Grant MD [facility]        Today's Diagnoses     Dyshidrotic eczema        Chronic constipation        Insomnia, unspecified type        External hemorrhoids        Vitamin D deficiency           Follow-ups after your visit        Who to contact     Please call your clinic at [phone] to:    Ask questions about your health    Make or cancel appointments    Discuss your medicines    Learn about your test results    Speak to your doctor            Additional Information About Your Visit        MyChart Information     [employer] is an electronic gateway that provides easy, online access to your medical records. With [employer], you can request a clinic appointment, read your test results, renew a prescription or communicate with your care team.     To sign up for [employer] visit the website at [email or web address]   You will be asked to enter the access code listed below, as well as some personal information. Please follow the directions to create your username and password.     Your access code is: [email or web address]  Expires: 2018  9:41 PM     Your access code will  in 90 days. If you need help or a new code, please contact your [facility] or call [phone] for assistance.        Care EveryWhere ID     This is your Care EveryWhere ID. This could be used by other organizations to access your [facility] medical records  [id number]        Your Vitals Were     Pulse Temperature Respirations Pulse Oximetry Breastfeeding? BMI (Body Mass Index)    67 97.4  F (36.3  C) (Oral) 20 97% No 17.18 kg/m2       Blood Pressure from Last 3 Encounters:   10/25/18 94/68   18 108/64   18 101/60    Weight from Last 3 Encounters:    10/25/18 92 lb 8 oz (42 kg)   08/09/18 94 lb (42.6 kg)   03/30/18 94 lb 12.8 oz (43 kg)              Today, you had the following     No orders found for display         Today's Medication Changes          These changes are accurate as of 10/25/18 11:36 AM.  If you have any questions, ask your nurse or doctor.               Start taking these medicines.        Dose/Directions    triamcinolone 0.1 % cream   Commonly known as:  KENALOG   Used for:  Dyshidrotic eczema   Started by:  Erin Grant MD        Apply sparingly to affected area two times daily   Quantity:  30 g   Refills:  3            Where to get your medicines      These medications were sent to [facility]/pharmacy [address] [place] [address] AT [address] [address]     Phone:  [phone]     cholecalciferol 89156 units capsule    docusate sodium 100 MG tablet    hydrocortisone 25 MG Suppository    traZODone 50 MG tablet    triamcinolone 0.1 % cream                Primary Care Provider Office Phone # Fax #    Erin Grant [phone]       [address] [address]        Equal Access to Services     [place] AH: Hadii young ku hadasho Soomaali, waaxda luqadaha, qaybta kaalmada adeegyada, waxay suziin hayjack johnson. So [facility] [phone].    ATENCIÓN: Si habla español, tiene a carvajal disposición servicios gratuitos de asistencia lingüística. ame al [phone].    We comply with applicable federal civil rights laws and [place] laws. We do not discriminate on the basis of race, color, national origin, age, disability, sex, sexual orientation, or gender identity.            Thank you!     Thank you for choosing [facility]  for your care. Our goal is always to provide you with excellent care. Hearing back from our patients is one way we can continue to improve our services. Please take a few minutes to  complete the written survey that you may receive in the mail after your visit with us. Thank you!             Your Updated Medication List - Protect others around you: Learn how to safely use, store and throw away your medicines at www.disposemymeds.org.          This list is accurate as of 10/25/18 11:36 AM.  Always use your most recent med list.                   Brand Name Dispense Instructions for use Diagnosis    acetaminophen 500 MG tablet    TYLENOL    100 tablet    Take 1-2 tablets (500-1,000 mg) by mouth every 6 hours as needed for mild pain    Scoliosis (and kyphoscoliosis), idiopathic       ammonium lactate 12 % cream    AMLACTIN    140 g    Apply topically daily    Dry skin       calcium carbonate-vitamin D 500-400 MG-UNIT Tabs per tablet     180 tablet    Take 1 tablet by mouth 2 times daily    Routine general medical examination at a health care facility       carboxymethylcellulose 0.5 % Soln ophthalmic solution    REFRESH PLUS    1 Bottle    Place 1 drop into both eyes daily as needed for dry eyes    Dry eyes       cholecalciferol 08136 units capsule    VITAMIN D3    16 capsule    Take 1 capsule (50,000 Units) by mouth once a week    Vitamin D deficiency       conjugated estrogens cream    PREMARIN    30 g    Place 0.5 g vaginally twice a week As needed for vaginal dryness.    Postmenopausal status       docusate sodium 100 MG tablet    COLACE    60 tablet    Take 100 mg by mouth 2 times daily as needed for constipation    Chronic constipation       fluticasone 50 MCG/ACT spray    FLONASE    16 g    Spray 1-2 sprays into both nostrils daily    Nasal congestion       guaiFENesin-dextromethorphan 100-10 MG/5ML syrup    ROBITUSSIN DM    236 mL    Take 5 mLs by mouth every 4 hours as needed for cough    Cough       hydrocortisone 2.5 % cream      Apply topically every night before bed for hemorrhoids        hydrocortisone 25 MG Suppository    ANUSOL-HC    28 suppository    Place 1 suppository (25 mg)  rectally 2 times daily as needed for hemorrhoids    External hemorrhoids       olopatadine 0.1 % ophthalmic solution    PATANOL    1 Bottle    Place 1 drop into both eyes 2 times daily    Chronic allergic conjunctivitis       order for DME     1 Units    Equipment being ordered: donut cushion    Sacral pain       traZODone 50 MG tablet    DESYREL    30 tablet    Take 1 tablet (50 mg) by mouth nightly as needed for sleep    Insomnia, unspecified type       triamcinolone 0.1 % cream    KENALOG    30 g    Apply sparingly to affected area two times daily    Dyshidrotic eczema       Witch Hazel Pads     1 each    Use prn bowel movements    Constipation

## 2018-10-25 NOTE — TELEPHONE ENCOUNTER

## 2018-10-25 NOTE — PROGRESS NOTES
KALLIE Carl is a 58-year-old with a past medical history of dry skin who presents for a two-week blistering and itchiness between her fingers on both hands. The itchiness is present between each of her fingers. She has experienced this issue before. Previously, she has used hydrocortisone cream, but she does not have any hydrocortisone cream now. She started using betamethasone, 0.05% that she got from someone else one week ago, which has been very helpful. Before trying betamethasone cream, had tried Jergens lotion, which didn't work. She has dry skin on the rest of her body as well. She uses Jergens lotion after showering, which works well for several hours but her skin gradually becomes dry and itchy again. She washes hands frequently with hot water and soap. After washing her hands, her hands will often become itchy     Does not have hydrocortisone at home. Just on fingers. Nowhere else on body. Between every finger. Nobody else that she lives with has any itching. Washes hands frequently with hot water and soap. Feel dry afterward. Showers every other day. Also hand washes clothes.    Also needs ointment for hemorrhoids. Does not remember the name.     Wondering whether vitamin D level is low, whether she should continue taking vitamin D.  Only took 4 pills of 19406 international unit(s) weekly. Was supposed to take 16 weeks. Was found to be deficient in August.           Chief Complaint   Patient presents with     Derm Problem     Itchi skin in the middle of her fingers        Problem, Medication and Allergy Lists were reviewed and updated if needed..    Patient is an established patient of this clinic..         Review of Systems:   Review of Systems         Physical Exam:     Vitals:    10/25/18 1031   BP: 94/68   BP Location: Left arm   Patient Position: Chair   Cuff Size: Adult Regular   Pulse: 67   Resp: 20   Temp: 97.4  F (36.3  C)   TempSrc: Oral   SpO2: 97%   Weight: 92 lb 8 oz (42 kg)      Body mass index is 17.18 kg/(m^2).  Vitals were reviewed and were normal  Blood pressure 94/68, pulse 67, temperature 97.4  F (36.3  C), temperature source Oral, resp. rate 20, weight 92 lb 8 oz (42 kg), SpO2 97 %, not currently breastfeeding.       Physical Exam  Pt with dry skin in general.  Finger webs looking normal. (But pt had used betamethasone on it.    Results:   No testing ordered today    Assessment and Plan      Hand dermatitis.  Discussed ways to avoid getting hands so dry, including wearing gloves.  Refilled triamcinolone.  Refilled other meds requested: vitamin D, colace, hemorrhoidal cream.       There are no discontinued medications.    Options for treatment and follow-up care were reviewed with the patient. Bridgette Carl  engaged in the decision making process and verbalized understanding of the options discussed and agreed with the final plan.    Preceptor Attestation:  I was present with the medical student who participated in the service and in the documentation of this note. I have verified the history and personally performed the physical exam and medical decision making. I have verified the content of the note, which accurately reflects my assessment of the patient and the plan of care. Visit length 25 min, >50% spent counseling re sxs and plan.     Supervising Physician:  Erin Grant MD

## 2018-10-26 RX ORDER — HYDROCORTISONE ACETATE 25 MG/1
25 SUPPOSITORY RECTAL 2 TIMES DAILY PRN
Qty: 28 SUPPOSITORY | Refills: 11 | Status: SHIPPED | OUTPATIENT
Start: 2018-10-26 | End: 2019-10-29

## 2018-12-24 DIAGNOSIS — R09.81 NASAL CONGESTION: ICD-10-CM

## 2018-12-24 RX ORDER — FLUTICASONE PROPIONATE 50 MCG
1-2 SPRAY, SUSPENSION (ML) NASAL DAILY
Qty: 16 G | Refills: 1 | Status: SHIPPED | OUTPATIENT
Start: 2018-12-24 | End: 2019-02-25

## 2018-12-24 NOTE — TELEPHONE ENCOUNTER

## 2019-02-25 DIAGNOSIS — R09.81 NASAL CONGESTION: ICD-10-CM

## 2019-02-25 RX ORDER — FLUTICASONE PROPIONATE 50 MCG
1-2 SPRAY, SUSPENSION (ML) NASAL DAILY
Qty: 16 G | Refills: 1 | Status: SHIPPED | OUTPATIENT
Start: 2019-02-25 | End: 2019-04-18

## 2019-02-25 NOTE — TELEPHONE ENCOUNTER

## 2019-02-26 DIAGNOSIS — E55.9 VITAMIN D DEFICIENCY: ICD-10-CM

## 2019-02-26 NOTE — TELEPHONE ENCOUNTER
Pts only do weekly high dose vitamin D for 16 weeks. Do not need refills of high dose afterwards.    Erin MCCLAIN

## 2019-02-26 NOTE — TELEPHONE ENCOUNTER
"Request for medication refill:    Providers if patient needs an appointment and you are willing to give a one month supply please refill for one month and  send a letter/MyChart using \".SMILLIMITEDREFILL\" .smillimited and route chart to \"P SMI \" (Giving one month refill in non controlled medications is strongly recommended before denial)    If refill has been denied, meaning absolutely no refills without visit, please complete the smart phrase \".smirxrefuse\" and route it to the \"P SMI MED REFILLS\"  pool to inform the patient and the pharmacy.    Jada Farias, [facility]        " Duration Of Freeze Thaw-Cycle (Seconds): 5 Render Post-Care Instructions In Note?: no Number Of Freeze-Thaw Cycles: 2 freeze-thaw cycles Post-Care Instructions: I reviewed with the patient in detail post-care instructions. Patient is to wear sunprotection, and avoid picking at any of the treated lesions. Pt may apply Vaseline to crusted or scabbing areas. Consent: The patient's consent was obtained including but not limited to risks of crusting, scabbing, blistering, scarring, darker or lighter pigmentary change, recurrence, incomplete removal and infection. Detail Level: Zone

## 2019-03-08 DIAGNOSIS — E55.9 VITAMIN D DEFICIENCY: ICD-10-CM

## 2019-03-08 NOTE — TELEPHONE ENCOUNTER

## 2019-03-08 NOTE — TELEPHONE ENCOUNTER
Medication Refill Denied  Reason: Only take 16 weeks of high dose.  Provider: I have not called the patient about the Rx denial, please call.  PCS: Please notify the pharmacy, Please contact the patient to explain reasoning provided above.  Erin Grant MD

## 2019-04-18 ENCOUNTER — OFFICE VISIT (OUTPATIENT)
Dept: FAMILY MEDICINE | Facility: CLINIC | Age: 59
End: 2019-04-18
Payer: COMMERCIAL

## 2019-04-18 VITALS
RESPIRATION RATE: 18 BRPM | TEMPERATURE: 97.4 F | HEIGHT: 62 IN | HEART RATE: 70 BPM | WEIGHT: 92 LBS | BODY MASS INDEX: 16.93 KG/M2 | OXYGEN SATURATION: 99 % | SYSTOLIC BLOOD PRESSURE: 93 MMHG | DIASTOLIC BLOOD PRESSURE: 62 MMHG

## 2019-04-18 DIAGNOSIS — H04.123 DRY EYES: ICD-10-CM

## 2019-04-18 DIAGNOSIS — K59.09 CHRONIC CONSTIPATION: ICD-10-CM

## 2019-04-18 DIAGNOSIS — R09.81 NASAL CONGESTION: ICD-10-CM

## 2019-04-18 DIAGNOSIS — H10.45 CHRONIC ALLERGIC CONJUNCTIVITIS: ICD-10-CM

## 2019-04-18 RX ORDER — FLUTICASONE PROPIONATE 50 MCG
1-2 SPRAY, SUSPENSION (ML) NASAL DAILY
Qty: 16 G | Refills: 3 | Status: SHIPPED | OUTPATIENT
Start: 2019-04-18 | End: 2019-07-31

## 2019-04-18 RX ORDER — ASPIRIN 81 MG
100 TABLET, DELAYED RELEASE (ENTERIC COATED) ORAL 2 TIMES DAILY PRN
Qty: 60 TABLET | Refills: 11 | Status: SHIPPED | OUTPATIENT
Start: 2019-04-18 | End: 2019-10-29

## 2019-04-18 RX ORDER — OLOPATADINE HYDROCHLORIDE 1 MG/ML
1 SOLUTION/ DROPS OPHTHALMIC 2 TIMES DAILY
Qty: 1 BOTTLE | Refills: 6 | Status: CANCELLED | OUTPATIENT
Start: 2019-04-18

## 2019-04-18 RX ORDER — CARBOXYMETHYLCELLULOSE SODIUM 5 MG/ML
1 SOLUTION/ DROPS OPHTHALMIC DAILY PRN
Qty: 1 BOTTLE | Refills: 11 | Status: SHIPPED | OUTPATIENT
Start: 2019-04-18 | End: 2019-10-29

## 2019-04-18 ASSESSMENT — ANXIETY QUESTIONNAIRES
7. FEELING AFRAID AS IF SOMETHING AWFUL MIGHT HAPPEN: NOT AT ALL
6. BECOMING EASILY ANNOYED OR IRRITABLE: SEVERAL DAYS
1. FEELING NERVOUS, ANXIOUS, OR ON EDGE: NOT AT ALL
2. NOT BEING ABLE TO STOP OR CONTROL WORRYING: NOT AT ALL
IF YOU CHECKED OFF ANY PROBLEMS ON THIS QUESTIONNAIRE, HOW DIFFICULT HAVE THESE PROBLEMS MADE IT FOR YOU TO DO YOUR WORK, TAKE CARE OF THINGS AT HOME, OR GET ALONG WITH OTHER PEOPLE: NOT DIFFICULT AT ALL
GAD7 TOTAL SCORE: 2
3. WORRYING TOO MUCH ABOUT DIFFERENT THINGS: NOT AT ALL
5. BEING SO RESTLESS THAT IT IS HARD TO SIT STILL: NOT AT ALL

## 2019-04-18 ASSESSMENT — PATIENT HEALTH QUESTIONNAIRE - PHQ9: 5. POOR APPETITE OR OVEREATING: SEVERAL DAYS

## 2019-04-18 ASSESSMENT — MIFFLIN-ST. JEOR: SCORE: 932.62

## 2019-04-18 NOTE — NURSING NOTE
Due to patient being non-English speaking/uses sign language, an  was used for this visit. Only for face-to-face interpretation by an external agency, date and length of interpretation can be found on the scanned worksheet.     name: Maryann  Agency: [employer]  Language: cantonese   Telephone number: [phone]  Type of interpretation: Telemedicine, spoken

## 2019-04-19 ASSESSMENT — PATIENT HEALTH QUESTIONNAIRE - PHQ9: SUM OF ALL RESPONSES TO PHQ QUESTIONS 1-9: 4

## 2019-04-20 ASSESSMENT — ANXIETY QUESTIONNAIRES: GAD7 TOTAL SCORE: 2

## 2019-04-21 NOTE — PROGRESS NOTES
SUBJECTIVE:  The patient is here with her sister who ended up acting as .  She has a couple issues.  She is complaining of some mucus in the back of her throat.  Also, needing refill of some eye drops.  She had 2 different kinds there and it looked like it was not so much the allergy eyedrops as the ones for dry eyes.  Then getting back to the mucus in the back of the throat, that had been going on for a week.  She felt like she was having some postnasal drip, no coughing, no sneezing and no runny nose.  She has been using Flonase, usually just 1 spray each nostril.      OBJECTIVE:  On exam, the patient is in no acute distress.  Vital signs are stable.  Looking at her nose, the left nostril looked quite clear but the right was very boggy.  Pharynx was actually unremarkable.  I did not really see any mucus.  Heart had a regular rate without murmur.  Lungs were clear.      IMPRESSION:  Postnasal drip with looking like nasal congestion.      PLAN:  Recommend increasing the Flonase to 2 sprays each nostril on a regular basis to see if that would help.  I went ahead and refilled the Flonase and the Refresh Plus eyedrops.  They also requested a refill of her Colace.  She has had a lot of problems with constipation and rectal problems.      Visit length was 15 minutes, more than 50% spent in counseling about symptoms and plan.     PHQ9 - 4  GAD7 - 2

## 2019-07-17 ENCOUNTER — OFFICE VISIT (OUTPATIENT)
Dept: FAMILY MEDICINE | Facility: CLINIC | Age: 59
End: 2019-07-17
Payer: COMMERCIAL

## 2019-07-17 VITALS
HEART RATE: 66 BPM | HEIGHT: 62 IN | DIASTOLIC BLOOD PRESSURE: 66 MMHG | BODY MASS INDEX: 17.3 KG/M2 | RESPIRATION RATE: 16 BRPM | TEMPERATURE: 97.9 F | SYSTOLIC BLOOD PRESSURE: 101 MMHG | OXYGEN SATURATION: 98 % | WEIGHT: 94 LBS

## 2019-07-17 DIAGNOSIS — Z13.9 SCREENING FOR CONDITION: Primary | ICD-10-CM

## 2019-07-17 LAB
ALBUMIN SERPL-MCNC: 4.8 MG/DL (ref 3.5–4.7)
ALP SERPL-CCNC: 57.1 U/L (ref 31.7–110.5)
ALT SERPL-CCNC: 11.9 U/L (ref 0–45)
AST SERPL-CCNC: 19.8 U/L (ref 0–45)
BILIRUB SERPL-MCNC: 0.6 MG/DL (ref 0.2–1.3)
BUN SERPL-MCNC: 7 MG/DL (ref 7–19)
CALCIUM SERPL-MCNC: 9.4 MG/DL (ref 8.5–10.1)
CHLORIDE SERPLBLD-SCNC: 95 MMOL/L (ref 98–110)
CHOLEST SERPL-MCNC: 155.2 MG/DL (ref 0–200)
CHOLEST/HDLC SERPL: 2.4 {RATIO} (ref 0–5)
CO2 SERPL-SCNC: 31.2 MMOL/L (ref 20–32)
CREAT SERPL-MCNC: 0.6 MG/DL (ref 0.5–1)
GFR SERPL CREATININE-BSD FRML MDRD: >90 ML/MIN/1.7 M2
GLUCOSE SERPL-MCNC: 96.2 MG'DL (ref 70–99)
HCT VFR BLD AUTO: 38.3 % (ref 35–47)
HDLC SERPL-MCNC: 63.6 MG/DL
HEMOGLOBIN: 11.6 G/DL (ref 11.7–15.7)
LDLC SERPL CALC-MCNC: 68 MG/DL (ref 0–129)
MCH RBC QN AUTO: 29.8 PG (ref 26.5–35)
MCHC RBC AUTO-ENTMCNC: 30.3 G/DL (ref 32–36)
MCV RBC AUTO: 98.5 FL (ref 78–100)
PLATELET # BLD AUTO: 171 K/UL (ref 150–450)
POTASSIUM SERPL-SCNC: 4.4 MMOL/DL (ref 3.3–4.5)
PROT SERPL-MCNC: 7.4 G/DL (ref 6.8–8.8)
RBC # BLD AUTO: 3.89 M/UL (ref 3.8–5.2)
SODIUM SERPL-SCNC: 133 MMOL/L (ref 132.6–141.4)
TRIGL SERPL-MCNC: 118.2 MG/DL (ref 0–150)
TSH SERPL DL<=0.005 MIU/L-ACNC: 2.16 MU/L (ref 0.4–4)
VLDL CHOLESTEROL: 23.6 MG/DL (ref 7–32)
WBC # BLD AUTO: 4 K/UL (ref 4–11)

## 2019-07-17 ASSESSMENT — MIFFLIN-ST. JEOR: SCORE: 943.5

## 2019-07-17 NOTE — PROGRESS NOTES
SUBJECTIVE:  The patient is here with her sister who is acting as an , here for a physical exam.  She has no particular concerns today.  We talked about healthcare maintenance items.  She has not wanted to do mammograms in the past but did have one about 5 years ago and her sister would talk with her later letter about doing one again.  She is due for doing lipids and HIV and the patient was interested in checking kidney and liver, thyroid and CBC which I was okay with.  I talked about the zoster shot and she was interested in that.  The sister asked about DEXA scan and Pneumovax and I discussed that those are usually done at 65, although she had osteopenia listed in the problem list, but I could not figure out where that came from.  The sister was quite sure that she had not had a DEXA scan so I am not sure about that.  Again, otherwise there were no questions or concerns.  She did fill out a health history form, which was fine on about the 20 item review of systems.  The only things that were positive were vision changes or irritation and feeling sleepy or extremely tired during most of the day.  Everything else was negative.      OBJECTIVE:  On exam, the patient is in no acute distress.  Vital signs are stable.  TMs are normal.  Pupils equal, round and reactive to light.  Fundi benign.  Pharynx is unremarkable.  No cervical adenopathy.  Heart had a regular rate and rhythm without murmurs.  Lungs were clear.  Abdomen was soft and nontender without masses.  Patellar reflexes were symmetric.      IMPRESSION:  Basically healthy female.      PLAN:  Ordered the HIV, lipids, CMP, TSH reflex, CBC and sister will discuss mammogram with her later and they will go to the pharmacy about the zoster shot.     Addendum: Found that she had a DEXA scan at [facility] in October of 2010.

## 2019-07-17 NOTE — LETTER
July 18, 2019      Bridgette Carl  [facility] [place] [address] E   [facility] [address]        Dear Bridgette,    Thank you for getting your care at [facility]. Please see below for your test results.    Resulted Orders   Lipid Panel ([facility])   Result Value Ref Range    Cholesterol 155.2 0.0 - 200.0 mg/dL    Cholesterol/HDL Ratio 2.4 0.0 - 5.0    HDL Cholesterol 63.6 >40.0 mg/dL    LDL Cholesterol Calculated 68 0 - 129 mg/dL    Triglycerides 118.2 0.0 - 150.0 mg/dL    VLDL Cholesterol 23.6 7.0 - 32.0 mg/dL   Comprehensive Metabolic Panel ([facility])   Result Value Ref Range    Albumin 4.8 (H) 3.5 - 4.7 mg/dL    Alkaline Phosphatase 57.1 31.7 - 110.5 U/L    ALT 11.9 0.0 - 45.0 U/L    AST 19.8 0.0 - 45.0 U/L    Bilirubin Total 0.6 0.2 - 1.3 mg/dL    Urea Nitrogen 7.0 7.0 - 19.0 mg/dL    Calcium 9.4 8.5 - 10.1 mg/dL    Chloride 95.0 (L) 98.0 - 110.0 mmol/L    Carbon Dioxide 31.2 20.0 - 32.0 mmol/L    Creatinine 0.6 0.5 - 1.0 mg/dL    Glucose 96.2 70.0 - 99.0 mg'dL    Potassium 4.4 3.3 - 4.5 mmol/dL    Sodium 133.0 132.6 - 141.4 mmol/L    Protein Total 7.4 6.8 - 8.8 g/dL    GFR Estimate >90 >60.0 mL/min/1.7 m2    GFR Estimate If Black >90 >60.0 mL/min/1.7 m2   TSH with free T4 reflex   Result Value Ref Range    TSH 2.16 0.40 - 4.00 mU/L   CBC with Plt ([facility])   Result Value Ref Range    WBC 4.0 4.0 - 11.0 K/uL    RBC 3.89 3.80 - 5.20 M/uL    Hemoglobin 11.6 (L) 11.7 - 15.7 g/dL    Hematocrit 38.3 35.0 - 47.0 %    MCV 98.5 78.0 - 100.0 fL    MCH 29.8 26.5 - 35.0 pg    MCHC 30.3 (L) 32.0 - 36.0 g/dL    Platelets 171.0 150.0 - 450.0 K/uL   HIV Antigen Antibody Combo   Result Value Ref Range    HIV Antigen Antibody Combo Nonreactive NR^Nonreactive          Comment:      HIV-1 p24 Ag & HIV-1/HIV-2 Ab Not Detected       Your tests are reassuring.  I was able to find that you did have a DEXA test at [facility] in 2010. That is where the diagnosis of osteopenia came from.    Sincerely,    Erin Grant MD

## 2019-07-18 LAB — HIV 1+2 AB+HIV1 P24 AG SERPL QL IA: NONREACTIVE

## 2019-07-31 DIAGNOSIS — R09.81 NASAL CONGESTION: ICD-10-CM

## 2019-07-31 RX ORDER — FLUTICASONE PROPIONATE 50 MCG
1-2 SPRAY, SUSPENSION (ML) NASAL DAILY
Qty: 16 G | Refills: 3 | Status: SHIPPED | OUTPATIENT
Start: 2019-07-31 | End: 2019-09-27

## 2019-07-31 NOTE — TELEPHONE ENCOUNTER
"Request for medication refill: Fluticasone prop 50mcg    Providers if patient needs an appointment and you are willing to give a one month supply please refill for one month and  send a letter/MyChart using \".SMILLIMITEDREFILL\" .smillimited and route chart to \"P SMI \" (Giving one month refill in non controlled medications is strongly recommended before denial)    If refill has been denied, meaning absolutely no refills without visit, please complete the smart phrase \".smirxrefuse\" and route it to the \"P SMI MED REFILLS\"  pool to inform the patient and the pharmacy.    Soheila Medrano, [facility]        "

## 2019-09-27 DIAGNOSIS — R09.81 NASAL CONGESTION: ICD-10-CM

## 2019-09-27 RX ORDER — FLUTICASONE PROPIONATE 50 MCG
1-2 SPRAY, SUSPENSION (ML) NASAL DAILY
Qty: 16 G | Refills: 3 | Status: SHIPPED | OUTPATIENT
Start: 2019-09-27 | End: 2019-10-29

## 2019-10-18 DIAGNOSIS — G47.00 INSOMNIA, UNSPECIFIED TYPE: ICD-10-CM

## 2019-10-18 RX ORDER — TRAZODONE HYDROCHLORIDE 50 MG/1
50 TABLET, FILM COATED ORAL
Qty: 30 TABLET | Refills: 11 | Status: SHIPPED | OUTPATIENT
Start: 2019-10-18 | End: 2020-07-10

## 2019-10-18 NOTE — TELEPHONE ENCOUNTER
Verify that the refill encounter hasn't been started Yes    [facility] phone call message- patient requesting a refill:    Full Medication Name:traZODone (DESYREL) 50 MG tablet    Dose: Sig - Route: Take 1 tablet (50 mg) by mouth nightly as needed for sleep - Oral     Pharmacy confirmed as       [id number] - [place], [place] - [address] [place] [id number]  Phone: [phone] Fax: [phone]  : Yes    Medication tab checked to see if medication has been sent  Yes    Additional Comments: Patient has medication left for few days.    OK to leave a message on voice mail? Yes    Advised patient refill may take up to 2 business days? Yes    Primary language: Cantonese      needed? Yes    Call taken on October 18, 2019 at 10:47 AM by Marianela Serra to P SMI MED REFILL

## 2019-10-18 NOTE — TELEPHONE ENCOUNTER

## 2019-10-29 ENCOUNTER — OFFICE VISIT (OUTPATIENT)
Dept: FAMILY MEDICINE | Facility: CLINIC | Age: 59
End: 2019-10-29
Payer: COMMERCIAL

## 2019-10-29 VITALS
SYSTOLIC BLOOD PRESSURE: 122 MMHG | HEART RATE: 76 BPM | BODY MASS INDEX: 17.08 KG/M2 | OXYGEN SATURATION: 99 % | TEMPERATURE: 98 F | HEIGHT: 62 IN | RESPIRATION RATE: 14 BRPM | WEIGHT: 92.8 LBS | DIASTOLIC BLOOD PRESSURE: 72 MMHG

## 2019-10-29 DIAGNOSIS — K59.09 CHRONIC CONSTIPATION: ICD-10-CM

## 2019-10-29 DIAGNOSIS — H04.123 DRY EYES: ICD-10-CM

## 2019-10-29 DIAGNOSIS — E55.9 VITAMIN D DEFICIENCY: ICD-10-CM

## 2019-10-29 DIAGNOSIS — R05.9 COUGH: ICD-10-CM

## 2019-10-29 DIAGNOSIS — Z23 NEED FOR PROPHYLACTIC VACCINATION AND INOCULATION AGAINST INFLUENZA: ICD-10-CM

## 2019-10-29 DIAGNOSIS — K64.4 EXTERNAL HEMORRHOIDS: ICD-10-CM

## 2019-10-29 DIAGNOSIS — J06.9 VIRAL URI WITH COUGH: Primary | ICD-10-CM

## 2019-10-29 RX ORDER — SENNOSIDES 8.6 MG
1 TABLET ORAL DAILY
Qty: 90 TABLET | Refills: 1 | Status: SHIPPED | OUTPATIENT
Start: 2019-10-29 | End: 2021-10-13

## 2019-10-29 RX ORDER — HYDROCORTISONE ACETATE 25 MG/1
25 SUPPOSITORY RECTAL 2 TIMES DAILY PRN
Qty: 28 SUPPOSITORY | Refills: 11 | Status: SHIPPED | OUTPATIENT
Start: 2019-10-29 | End: 2022-10-25

## 2019-10-29 RX ORDER — BENZONATATE 100 MG/1
100 CAPSULE ORAL 3 TIMES DAILY PRN
Qty: 15 CAPSULE | Refills: 0 | Status: SHIPPED | OUTPATIENT
Start: 2019-10-29 | End: 2019-12-03

## 2019-10-29 RX ORDER — CARBOXYMETHYLCELLULOSE SODIUM 5 MG/ML
1 SOLUTION/ DROPS OPHTHALMIC DAILY PRN
Qty: 1 BOTTLE | Refills: 11 | Status: SHIPPED | OUTPATIENT
Start: 2019-10-29 | End: 2020-02-04

## 2019-10-29 RX ORDER — ASPIRIN 81 MG
100 TABLET, DELAYED RELEASE (ENTERIC COATED) ORAL 2 TIMES DAILY PRN
Qty: 60 TABLET | Refills: 11 | Status: SHIPPED | OUTPATIENT
Start: 2019-10-29 | End: 2021-02-24

## 2019-10-29 RX ORDER — LIDOCAINE HYDROCHLORIDE 20 MG/ML
JELLY TOPICAL 2 TIMES DAILY PRN
Qty: 30 ML | Refills: 1 | Status: SHIPPED | OUTPATIENT
Start: 2019-10-29 | End: 2019-12-03

## 2019-10-29 ASSESSMENT — ENCOUNTER SYMPTOMS
CHOKING: 0
DIZZINESS: 0
SORE THROAT: 0
BRUISES/BLEEDS EASILY: 0
SINUS PRESSURE: 0
SHORTNESS OF BREATH: 0
WEAKNESS: 0
ALLERGIC/IMMUNOLOGIC NEGATIVE: 1
PALPITATIONS: 0
CONSTIPATION: 1
EYES NEGATIVE: 1
NAUSEA: 0
FEVER: 0
FACIAL SWELLING: 0
VOMITING: 0
WHEEZING: 0
ABDOMINAL PAIN: 1
ENDOCRINE NEGATIVE: 1
MUSCULOSKELETAL NEGATIVE: 1
LIGHT-HEADEDNESS: 0
TROUBLE SWALLOWING: 0
FACIAL ASYMMETRY: 0
UNEXPECTED WEIGHT CHANGE: 0
DIAPHORESIS: 0
DIARRHEA: 0
ANAL BLEEDING: 0
RECTAL PAIN: 1
BLOOD IN STOOL: 0
PSYCHIATRIC NEGATIVE: 1
ADENOPATHY: 0
COUGH: 1
RHINORRHEA: 0
CHILLS: 0
SINUS PAIN: 0
STRIDOR: 0
ABDOMINAL DISTENTION: 0

## 2019-10-29 ASSESSMENT — MIFFLIN-ST. JEOR: SCORE: 936.25

## 2019-10-29 NOTE — PROGRESS NOTES
I was present with the medical student who participated in the service and in the documentation of this note. I have verified the history and personally performed the physical exam and medical decision making, and have verified the content of the note, which accurately reflects my assessment of the patient and the plan of care.   Brandi Salazar MD    HM - got 2/2 shingrix shot on 10/28/2019            KALLIE Carl is a 59 year old  who presents for   Chief Complaint   Patient presents with     Cough     10 days with phlem     Hemorrhoids     constipation        1. Cough   Productive cough for 10 days with yellow sputum production. Cough is constant but worse at night. Cough makes sleeping difficult and wakes her up repeatedly throughout the night. Now having abdominal/diaphragmatic pain due to constant coughing effort. Has had similar symptoms about once a year, usually in the winter. Her sister reports they tried using an OTC medication/therapy for cough, but this did not alleviate her symptoms.     No fevers, chills, sweats, unexplained weight loss, dysphagia, sinus pain, chest pain, shortness of breath, wheezing, hemoptysis, dyspnea, nausea, vomiting, lightheadedness, weakness, numbness, or tingling.       2. Constipation  History of chronic constipation. Has been using colace, which helps bowel movements to be more regular and easier to pass, but has had difficulties with bowel movements recently due to being off colace (last bowel movement was yesterday, with previous bowel movement 4 days prior to that) and is interested in considering additional therapy. Has required manual disimpaction, glycerin suppositories in past.    3. Hemorrhoids  History of chronic hemorrhoids. Recently had painful hemorrhoids protruding from the rectum, especially with bowel movements, but hemorrhoids have receded within rectal vault. Hemorrhoids not causing acute distress currently but are causing perianal discomfort and  "itchiness. Does not have creams or topical ointments to manage symptoms at home.    A Foodem  was used for  this visit.  Bridgette's sister also helped convey Bridgette's history and symptoms.  +++++++      Problem, Medication and Allergy Lists were reviewed and updated if needed..    Patient is an established patient of this clinic..         Review of Systems:   Review of Systems   Constitutional: Negative for chills, diaphoresis, fever and unexpected weight change.   HENT: Negative for congestion, ear discharge, ear pain, facial swelling, rhinorrhea, sinus pressure, sinus pain, sore throat and trouble swallowing.    Eyes: Negative.    Respiratory: Positive for cough. Negative for choking, shortness of breath, wheezing and stridor.    Cardiovascular: Negative for chest pain, palpitations and leg swelling.   Gastrointestinal: Positive for abdominal pain (from cough effort), constipation and rectal pain (itchiness, pain due to hemorrhoids). Negative for abdominal distention, anal bleeding, blood in stool, diarrhea, nausea and vomiting.   Endocrine: Negative.    Genitourinary: Negative.    Musculoskeletal: Negative.    Skin: Negative.    Allergic/Immunologic: Negative.    Neurological: Negative for dizziness, syncope, facial asymmetry, weakness and light-headedness.   Hematological: Negative for adenopathy. Does not bruise/bleed easily.   Psychiatric/Behavioral: Negative.             Physical Exam:     Vitals:    10/29/19 1542   BP: 122/72   Pulse: 76   Resp: 14   Temp: 98  F (36.7  C)   TempSrc: Oral   SpO2: 99%   Weight: 42.1 kg (92 lb 12.8 oz)   Height: 1.562 m (5' 1.5\")     Body mass index is 17.25 kg/m .  Vitals were reviewed and were normal     Physical Exam  Constitutional:       General: She is not in acute distress.     Appearance: Normal appearance. She is not ill-appearing, toxic-appearing or diaphoretic.   HENT:      Head: Normocephalic and atraumatic.      Right Ear: Tympanic membrane and ear canal " normal.      Left Ear: Tympanic membrane and ear canal normal.      Nose: No congestion or rhinorrhea.      Mouth/Throat:      Mouth: Mucous membranes are moist.      Pharynx: Oropharynx is clear. No oropharyngeal exudate or posterior oropharyngeal erythema.   Eyes:      General: No scleral icterus.        Right eye: No discharge.         Left eye: No discharge.      Extraocular Movements: Extraocular movements intact.      Conjunctiva/sclera: Conjunctivae normal.      Pupils: Pupils are equal, round, and reactive to light.   Neck:      Musculoskeletal: Normal range of motion and neck supple. No neck rigidity or muscular tenderness.   Cardiovascular:      Rate and Rhythm: Normal rate and regular rhythm.      Heart sounds: Normal heart sounds. No murmur. No friction rub. No gallop.    Pulmonary:      Effort: Pulmonary effort is normal. No respiratory distress.      Breath sounds: Normal breath sounds. No stridor. No wheezing, rhonchi or rales.   Abdominal:      General: Abdomen is flat. There is no distension.      Palpations: Abdomen is soft. There is no mass.      Tenderness: There is no tenderness. There is no guarding or rebound.   Musculoskeletal:      Right lower leg: No edema.      Left lower leg: No edema.   Lymphadenopathy:      Cervical: No cervical adenopathy.   Skin:     General: Skin is warm and dry.      Capillary Refill: Capillary refill takes 2 to 3 seconds.      Coloration: Skin is not jaundiced or pale.      Findings: No erythema or rash.   Neurological:      General: No focal deficit present.      Mental Status: She is alert and oriented to person, place, and time.   Psychiatric:         Mood and Affect: Mood normal.         Behavior: Behavior normal.           Results:   No testing ordered today    Assessment and Plan        1. Cough  Likely viral URI with cough. Lungs clear on exam, reassuring. Afebrile.  -recommended cough suppressants for symptomatic management  -if symptoms do not improve with  medication or within 1-2 weeks, recommend follow up in clinic for further evaluation and management  - benzonatate (TESSALON) 100 MG capsule; Take 1 capsule (100 mg) by mouth 3 times daily as needed for cough  Dispense: 15 capsule; Refill: 0      2. Chronic constipation  -due to past success with colace, provided refill today  -due to persistent concerns regarding constipation and hemorrhoids, recommend adding senna  Did discuss miralax but has not tolerated it in past.  - sennosides (SENOKOT) 8.6 MG tablet; Take 1 tablet by mouth daily  Dispense: 90 tablet; Refill: 1  - docusate sodium (COLACE) 100 MG tablet; Take 1 tablet (100 mg) by mouth 2 times daily as needed for constipation  Dispense: 60 tablet; Refill: 11      3. External hemorrhoids  -as hemorrhoids are not causing acute pain at this time, considered symptomatic management for discomfort and itching  -recommend topical hydrocortisone and lidocaine medications  - hydrocortisone (ANUSOL-HC) 25 MG suppository; Place 1 suppository (25 mg) rectally 2 times daily as needed for hemorrhoids  Dispense: 28 suppository; Refill: 11  - lidocaine (XYLOCAINE) 2 % external gel; Apply topically 2 times daily as needed for moderate pain  Dispense: 30 mL; Refill: 1      4. Need for prophylactic vaccination and inoculation against influenza  -vaccine given in clinic today  - Fluzone quad, preserve-free/prefilled  0.5ml, 6+ months [86612]      5. Dry eyes  -no concerns today, continue current management  - carboxymethylcellulose (REFRESH PLUS) 0.5 % SOLN ophthalmic solution; Place 1 drop into both eyes daily as needed for dry eyes  Dispense: 1 Bottle; Refill: 11    6. Vitamin D deficiency  -no new concerns today, so continue current managment  - vitamin D3 (CHOLECALCIFEROL) 1.25 MG (69993 UT) capsule; Take 1 capsule (50,000 Units) by mouth once a week  Dispense: 16 capsule; Refill: 0       There are no discontinued medications.    Options for treatment and follow-up care were  reviewed with the patient. Bridgette Carl  engaged in the decision making process and verbalized understanding of the options discussed and agreed with the final plan.    Theresa Mendosa, MS3

## 2019-10-29 NOTE — NURSING NOTE
Due to patient being non-English speaking/uses sign language, an  was used for this visit. Only for face-to-face interpretation by an external agency, date and length of interpretation can be found on the scanned worksheet.     name: vikash Feng  Agency: [employer]  Language: cantonese   Telephone number: [phone]  Type of interpretation: Face-to-face, spoken

## 2019-10-29 NOTE — PATIENT INSTRUCTIONS
Here is the plan from today's visit    Cough  - benzonatate (TESSALON) 100 MG capsule; Take 1 capsule (100 mg) by mouth 3 times daily as needed for cough  Dispense: 15 capsule; Refill: 0  Please call if symptoms worsen, or if you develop fever, poor oral intake.    External hemorrhoids  Constipation  - hydrocortisone (ANUSOL-HC) 25 MG suppository; Place 1 suppository (25 mg) rectally 2 times daily as needed for hemorrhoids  Dispense: 28 suppository; Refill: 11  - sennosides (SENOKOT) 8.6 MG tablet; Take 1 tablet by mouth daily  Dispense: 90 tablet; Refill: 1  - lidocaine (XYLOCAINE) 2 % external gel; Apply topically 2 times daily as needed for moderate pain  Dispense: 30 mL; Refill: 1  - docusate sodium (COLACE) 100 MG tablet; Take 1 tablet (100 mg) by mouth 2 times daily as needed for constipation  Dispense: 60 tablet; Refill: 11    Dry eyes  - carboxymethylcellulose (REFRESH PLUS) 0.5 % SOLN ophthalmic solution; Place 1 drop into both eyes daily as needed for dry eyes  Dispense: 1 Bottle; Refill: 11    Vitamin D deficiency  - vitamin D3 (CHOLECALCIFEROL) 1.25 MG (94537 UT) capsule; Take 1 capsule (50,000 Units) by mouth once a week  Dispense: 16 capsule; Refill: 0    Flu shot  -influenza vaccination given today in clinic      Please call or return to clinic if your symptoms don't go away.    Follow up plan  Please make a clinic appointment for follow up with your primary physician Brandi Salazar MD as needed.  Thank you for coming to [facility] today.  Lab Testing:  **If you had lab testing today and your results are reassuring or normal they will be mailed to you or sent through [employer] within 7 days.   **If the lab tests need quick action we will call you with the results.  The phone number we will call with results is # [phone] (home) [phone] (work). If this is not the best number please call our clinic and change the number.  Medication Refills:  If you need any refills please call your  pharmacy and they will contact us.   If you need to  your refill at a new pharmacy, please contact the new pharmacy directly. The new pharmacy will help you get your medications transferred faster.   Scheduling:  If you have any concerns about today's visit or wish to schedule another appointment please call our office during normal business hours [phone] (8-5:00 M-F)  If a referral was made to a [facility] and you don't get a call from central scheduling please call [phone].  If a Mammogram was ordered for you at [facility] call [phone] to schedule or change your appointment.  If you had an XRay/CT/Ultrasound/MRI ordered the number is [phone] to schedule or change your radiology appointment.   Medical Concerns:  If you have urgent medical concerns please call [phone] at any time of the day.    Brandi Salazar MD

## 2019-10-29 NOTE — PROGRESS NOTES
Preceptor Attestation:   Patient seen, evaluated and discussed with the resident. I have verified the content of the note, which accurately reflects my assessment of the patient and the plan of care.   Supervising Physician:  Lillie Grace MD

## 2019-12-03 ENCOUNTER — OFFICE VISIT (OUTPATIENT)
Dept: FAMILY MEDICINE | Facility: CLINIC | Age: 59
End: 2019-12-03
Payer: COMMERCIAL

## 2019-12-03 ENCOUNTER — TELEPHONE (OUTPATIENT)
Dept: FAMILY MEDICINE | Facility: CLINIC | Age: 59
End: 2019-12-03

## 2019-12-03 VITALS
WEIGHT: 88.8 LBS | DIASTOLIC BLOOD PRESSURE: 59 MMHG | HEIGHT: 63 IN | BODY MASS INDEX: 15.73 KG/M2 | SYSTOLIC BLOOD PRESSURE: 90 MMHG | OXYGEN SATURATION: 98 % | HEART RATE: 97 BPM | TEMPERATURE: 97.8 F

## 2019-12-03 DIAGNOSIS — M41.20 SCOLIOSIS (AND KYPHOSCOLIOSIS), IDIOPATHIC: ICD-10-CM

## 2019-12-03 DIAGNOSIS — K64.4 EXTERNAL HEMORRHOIDS: ICD-10-CM

## 2019-12-03 DIAGNOSIS — R05.9 COUGH: ICD-10-CM

## 2019-12-03 DIAGNOSIS — K12.1 STOMATITIS AND MUCOSITIS: Primary | ICD-10-CM

## 2019-12-03 DIAGNOSIS — K12.30 STOMATITIS AND MUCOSITIS: Primary | ICD-10-CM

## 2019-12-03 RX ORDER — DEXAMETHASONE 0.5 MG/5ML
0.5 ELIXIR ORAL 2 TIMES DAILY
Qty: 140 ML | Refills: 0 | Status: SHIPPED | OUTPATIENT
Start: 2019-12-03 | End: 2021-10-13

## 2019-12-03 RX ORDER — LIDOCAINE HYDROCHLORIDE 20 MG/ML
JELLY TOPICAL 2 TIMES DAILY PRN
Qty: 30 ML | Refills: 1 | Status: SHIPPED | OUTPATIENT
Start: 2019-12-03 | End: 2019-12-03

## 2019-12-03 RX ORDER — BENZONATATE 100 MG/1
100 CAPSULE ORAL 3 TIMES DAILY PRN
Qty: 15 CAPSULE | Refills: 0 | Status: SHIPPED | OUTPATIENT
Start: 2019-12-03 | End: 2019-12-03

## 2019-12-03 RX ORDER — BENZONATATE 100 MG/1
100 CAPSULE ORAL 3 TIMES DAILY PRN
Qty: 15 CAPSULE | Refills: 0 | Status: SHIPPED | OUTPATIENT
Start: 2019-12-03 | End: 2020-02-04

## 2019-12-03 RX ORDER — ACETAMINOPHEN 500 MG
500-1000 TABLET ORAL EVERY 6 HOURS PRN
Qty: 100 TABLET | Refills: 5 | Status: SHIPPED | OUTPATIENT
Start: 2019-12-03 | End: 2020-12-11

## 2019-12-03 RX ORDER — DEXAMETHASONE 0.5 MG/5ML
0.5 ELIXIR ORAL 2 TIMES DAILY
Qty: 140 ML | Refills: 0 | Status: SHIPPED | OUTPATIENT
Start: 2019-12-03 | End: 2019-12-03

## 2019-12-03 RX ORDER — LIDOCAINE HYDROCHLORIDE 20 MG/ML
JELLY TOPICAL 2 TIMES DAILY PRN
Qty: 30 ML | Refills: 1 | Status: SHIPPED | OUTPATIENT
Start: 2019-12-03 | End: 2020-08-18

## 2019-12-03 ASSESSMENT — ENCOUNTER SYMPTOMS
RHINORRHEA: 1
SORE THROAT: 0
CHILLS: 0
FEVER: 0
TROUBLE SWALLOWING: 0

## 2019-12-03 ASSESSMENT — MIFFLIN-ST. JEOR: SCORE: 935.53

## 2019-12-03 NOTE — TELEPHONE ENCOUNTER
CENTRAL PRIOR AUTHORIZATION  [phone]    PA Initiation    Medication: PA for benzonatate 100 mg   Insurance Company: [employer] - Phone [phone] Fax [phone]  Pharmacy Filling the Rx: [place], [place] - [address]  Filling Pharmacy Phone: [phone]  Filling Pharmacy Fax:    Start Date: 12/3/2019

## 2019-12-03 NOTE — PROGRESS NOTES
HPI       Bridgette Carl is a 59 year old  who presents for   Chief Complaint   Patient presents with     Mouth Problem     cold sores, x 3 weeks, unable to eat or sleep due to pain       Rash/Lesion  Onset: 3 weeks    Description:   Location: underneath the tongue  Color: whitish with red rim.   Character: round  Itching (Pruritis): no  Pain?:Yes Details: very painful, can't eat with it    Progression of Symptoms:  worsening    Accompanying Signs & Symptoms:  Fever: no  Sore throat symptoms: no sore throat  Recent cold symptoms: occasional dry cough, a little nasal congestion (chronic uses nasal spray)    History:   Previous similar rash: Yes Details: yes, gets this once or twice per year.     Precipitating factors:   Exposure to similar rash: No    What makes it better? Nothing has helped  Therapies Tried and outcome:  Salt water rinse, ice water rinse, no help    Would like refills of benzonotate pearls for  A cough and lidocaine for hemorrhoids.    A Gambian  was used for  this visit.    +++++++      Problem, Medication and Allergy Lists were      Patient Active Problem List    Diagnosis Date Noted     Rash and nonspecific skin eruption 10/28/2015     Priority: Medium     Insect bite.       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 Provider to review       Insomnia 11/02/2012     Priority: Medium     Problem list name updated by automated process. 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 October  "2010     .    Patient is an established patient of this clinic..         Review of Systems:   Review of Systems   Constitutional: Negative for chills and fever.   HENT: Positive for dental problem, mouth sores and rhinorrhea. Negative for sore throat and trouble swallowing.             Physical Exam:     Vitals:    12/03/19 1044   BP: 90/59   Pulse: 97   Temp: 97.8  F (36.6  C)   TempSrc: Oral   SpO2: 98%   Weight: 40.3 kg (88 lb 12.8 oz)   Height: 1.59 m (5' 2.6\")     Body mass index is 15.93 kg/m .  Vitals were reviewed and were normal     Physical Exam  GEN: Alert, oriented person in NAD  HENT: Oropharynx with one 0.25cm sublingual aphthous ulcer with white roof and erythematous base. Poor dentition with partial dentures.  NECK: full ROM  RESPIRATORY: no respiratory distress, no extra WOB, speaking in full sentences. CTAB.  CVS: regular rate, WWP  MSK: full ROM, no obvious peripheral edema. Normal gait.  NEURO: no FND.         Results:   No testing ordered today    Assessment and Plan        Bridgette was seen today for mouth problem.    Diagnoses and all orders for this visit:    Aphthous Stomatitis and mucositis of mouth  Patient has recurrent aphthous stomatitis in the setting of poor oral hygiene. Discussed the importance of improved oral hygiene and regular dental visits for prevention of these sores. Prescribed 2 weeks of magic mouthwash for pain and decadron elixir for treatment of ulcer; provided extensive counseling on proper administration with Moldovan . Patient indicated her understanding.   -     dexamethasone (DECADRON) 0.5 MG/5ML elixir; Take 5 mLs (0.5 mg) by mouth 2 times daily Swish and spit 5ml twice a day. Should keep the solution in your mouth for 5 minutes before spitting.  -     magic mouthwash suspension (diphenhydrAMINE, lidocaine, aluminum-magnesium & simethicone); Swish and spit 10 mLs in mouth every 6 hours as needed for mouth sores    Cough  Refill  -     benzonatate " (TESSALON) 100 MG capsule; Take 1 capsule (100 mg) by mouth 3 times daily as needed for cough    External hemorrhoids  Hemorrhoids have improved, but patient would like refill in case of recurrence.   -     lidocaine (XYLOCAINE) 2 % external gel; Apply topically 2 times daily as needed for moderate pain    Scoliosis  Refill  -     acetaminophen (TYLENOL) 500 MG tablet; Take 1-2 tablets (500-1,000 mg) by mouth every 6 hours as needed for mild pain           There are no discontinued medications.    Options for treatment and follow-up care were reviewed with the patient. Bridgette Carl  engaged in the decision making process and verbalized understanding of the options discussed and agreed with the final plan.    Amanda Monahan MD

## 2019-12-03 NOTE — TELEPHONE ENCOUNTER
PRIOR AUTHORIZATION FOR   Drug: Benzonatate 100 mg   Insurance: [facility]  ID Number: [id number]  BIN Number: [id number]  N Number: [id number]  Group Number: [id number]  Phone Number: [phone]    Please notify pharmacy if Prior Auth is approved or denied

## 2019-12-03 NOTE — PATIENT INSTRUCTIONS
Here is the plan from today's visit    1. Aphthous ulcers/canker sores  The most important thing you can do to prevent these sores is good dental hygiene. Please see your dentist soon, brush your teeth twice per day and try an alcohol-based mouthwash like Listerine. To heal the ulcer, please use the decadron solution twice a day: put 5ml in your mouth, swish for FIVE MINUTES and then SPIT. For pain you can use the magic mouthwash every 6 hours: swish a small amount in the mouth for 30 seconds and then spit out.  - magic mouthwash suspension (diphenhydrAMINE, lidocaine, aluminum-magnesium & simethicone); Swish and spit 10 mLs in mouth every 6 hours as needed for mouth sores  Dispense: 1 Bottle; Refill: 0  - dexamethasone (DECADRON) 0.5 MG/5ML elixir; Take 5 mLs (0.5 mg) by mouth 2 times daily for 14 days Swish and spit 5ml twice a day. Should keep the solution in your mouth for 5 minutes before spitting.  Dispense: 140 mL; Refill: 0    2. Cough  - benzonatate (TESSALON) 100 MG capsule; Take 1 capsule (100 mg) by mouth 3 times daily as needed for cough  Dispense: 15 capsule; Refill: 0    3. External hemorrhoids  - lidocaine (XYLOCAINE) 2 % external gel; Apply topically 2 times daily as needed for moderate pain  Dispense: 30 mL; Refill: 1      Please call or return to clinic if your symptoms don't go away.    Follow up plan  - Follow-up with me as needed, especially if your mouth sore is not getting better in the next two weeks.   - Please make an appointment to see your dentist soon    Thank you for coming to [facility] today.  Lab Testing:  The phone number we will call with results is # [phone] (home) [phone] (work). If this is not the best number please call our clinic and change the number.  Medication Refills:  If you need any refills please call your pharmacy and they will contact us.   If you need to  your refill at a new pharmacy, please contact the new pharmacy directly. The new pharmacy  will help you get your medications transferred faster.   Scheduling:  If you have any concerns about today's visit or wish to schedule another appointment please call our office during normal business hours [phone] (8-5:00 M-F)  If a referral was made to a [facility] and you don't get a call from central scheduling please call [phone].  If a Mammogram was ordered for you at [facility] call [phone] to schedule or change your appointment.  If you had an XRay/CT/Ultrasound/MRI ordered the number is [phone] to schedule or change your radiology appointment.   Medical Concerns:  If you have urgent medical concerns please call [phone] at any time of the day.    Amanda Monahan MD

## 2019-12-03 NOTE — PROGRESS NOTES
Preceptor Attestation:   Patient seen, evaluated and discussed with the resident. I have verified the content of the note, which accurately reflects my assessment of the patient and the plan of care.   Supervising Physician:  Boris Retana MD.

## 2019-12-03 NOTE — NURSING NOTE
Due to patient being non-English speaking/uses sign language, an  was used for this visit. Only for face-to-face interpretation by an external agency, date and length of interpretation can be found on the scanned worksheet.     name: Paco Dang  Agency: [employer]  Language: cantonese   Type of interpretation: Face-to-face, spoken    Priscila Wei MA on 12/3/2019 at 11:06 AM

## 2020-02-04 ENCOUNTER — OFFICE VISIT (OUTPATIENT)
Dept: FAMILY MEDICINE | Facility: CLINIC | Age: 60
End: 2020-02-04
Payer: COMMERCIAL

## 2020-02-04 VITALS
OXYGEN SATURATION: 100 % | WEIGHT: 87 LBS | BODY MASS INDEX: 16.01 KG/M2 | RESPIRATION RATE: 16 BRPM | HEIGHT: 62 IN | HEART RATE: 70 BPM | TEMPERATURE: 97.6 F | SYSTOLIC BLOOD PRESSURE: 108 MMHG | DIASTOLIC BLOOD PRESSURE: 74 MMHG

## 2020-02-04 DIAGNOSIS — K12.1 STOMATITIS AND MUCOSITIS: Primary | ICD-10-CM

## 2020-02-04 DIAGNOSIS — R05.9 COUGH: ICD-10-CM

## 2020-02-04 DIAGNOSIS — K12.30 STOMATITIS AND MUCOSITIS: Primary | ICD-10-CM

## 2020-02-04 DIAGNOSIS — H04.123 DRY EYES: ICD-10-CM

## 2020-02-04 LAB
ERYTHROCYTE [SEDIMENTATION RATE] IN BLOOD: 26 MM/HR (ref 0–30)
FOLATE SERPL-MCNC: 17.2 NG/ML
IRON SATN MFR SERPL: 30 % (ref 15–46)
IRON SERPL-MCNC: 95 UG/DL (ref 35–180)
TIBC SERPL-MCNC: 317 UG/DL (ref 240–430)
TSH SERPL DL<=0.005 MIU/L-ACNC: 1.41 MU/L (ref 0.4–4)
VIT B12 SERPL-MCNC: 124 PG/ML (ref 193–986)

## 2020-02-04 RX ORDER — BENZONATATE 100 MG/1
100 CAPSULE ORAL 3 TIMES DAILY PRN
Qty: 15 CAPSULE | Refills: 0 | Status: SHIPPED | OUTPATIENT
Start: 2020-02-04 | End: 2021-10-13

## 2020-02-04 RX ORDER — CARBOXYMETHYLCELLULOSE SODIUM 5 MG/ML
1 SOLUTION/ DROPS OPHTHALMIC DAILY PRN
Qty: 1 BOTTLE | Refills: 11 | Status: SHIPPED | OUTPATIENT
Start: 2020-02-04 | End: 2023-10-12

## 2020-02-04 RX ORDER — AMOXICILLIN 500 MG/1
500 CAPSULE ORAL 2 TIMES DAILY
Qty: 14 CAPSULE | Refills: 0 | Status: SHIPPED | OUTPATIENT
Start: 2020-02-04 | End: 2021-10-13

## 2020-02-04 RX ORDER — DEXTROMETHORPHAN POLISTIREX 30 MG/5ML
60 SUSPENSION ORAL 2 TIMES DAILY
Qty: 148 ML | Refills: 0 | Status: SHIPPED | OUTPATIENT
Start: 2020-02-04 | End: 2021-10-13

## 2020-02-04 ASSESSMENT — MIFFLIN-ST. JEOR: SCORE: 914.88

## 2020-02-04 NOTE — PATIENT INSTRUCTIONS
Here is the plan from today's visit    1. Dry eyes  - carboxymethylcellulose (REFRESH PLUS) 0.5 % SOLN ophthalmic solution; Place 1 drop into both eyes daily as needed for dry eyes  Dispense: 1 Bottle; Refill: 11    2. Cough  - benzonatate (TESSALON) 100 MG capsule; Take 1 capsule (100 mg) by mouth 3 times daily as needed for cough  Dispense: 15 capsule; Refill: 0  - dextromethorphan (DELSYM) 30 MG/5ML liquid; Take 10 mLs (60 mg) by mouth 2 times daily  Dispense: 148 mL; Refill: 0    3. Aphthous Stomatitis and mucositis of mouth  - Folate  - Iron and iron binding capacity  - Vitamin B12  - OTOLARYNGOLOGY REFERRAL - INTERNAL  - Erythrocyte Sedimentation Rate ([place])    4. Thyroid nodule  - TSH with free T4 reflex    Please call or return to clinic if your symptoms don't go away.    Follow up plan  Please make a clinic appointment for follow up with me (CROW KOROMA) in 2  weeks after results.    Thank you for coming to [facility] today.  Lab Testing:  **If you had lab testing today and your results are reassuring or normal they will be mailed to you or sent through [employer] within 7 days.   **If the lab tests need quick action we will call you with the results.  The phone number we will call with results is # [phone] (home) [phone] (work). If this is not the best number please call our clinic and change the number.  Medication Refills:  If you need any refills please call your pharmacy and they will contact us.   If you need to  your refill at a new pharmacy, please contact the new pharmacy directly. The new pharmacy will help you get your medications transferred faster.   Scheduling:  If you have any concerns about today's visit or wish to schedule another appointment please call our office during normal business hours [phone] (8-5:00 M-F)  If a referral was made to a [facility] and you don't get a call from central scheduling please call  [phone].  If a Mammogram was ordered for you at [facility] call [phone] to schedule or change your appointment.  If you had an XRay/CT/Ultrasound/MRI ordered the number is [phone] to schedule or change your radiology appointment.   Medical Concerns:  If you have urgent medical concerns please call [phone] at any time of the day.    Damon Andrade MD    ENT referral  [address]  3rd Floor  [facility] [address]  [phone]  2/18/2020 @ 10:45a.m with     Patient informed in clinic

## 2020-02-04 NOTE — TELEPHONE ENCOUNTER
FUTURE VISIT INFORMATION      FUTURE VISIT INFORMATION:    Date: 2/18/20    Time: 11 AM    Location: [facility]  REFERRAL INFORMATION:    Referring provider:  Dr. Andrade    Referring providers clinic:  [facility]    Reason for visit/diagnosis: Stomatitis ad Mucositis    RECORDS REQUESTED FROM:       Clinic name Comments Records Status Imaging Status   [place] 2/4/20 - OV with Dr. Andrade  12/03/19 - OV with Dr. Monahan  10/29/19 - OV with Dr. Martin Sierra

## 2020-02-04 NOTE — NURSING NOTE
Due to patient being non-English speaking/uses sign language, an  was used for this visit. Only for face-to-face interpretation by an external agency, date and length of interpretation can be found on the scanned worksheet.     name: Jeferson Wei  Agency: [employer]  Language: cantonese   Telephone number: [phone]  Type of interpretation: Face-to-face, spoken

## 2020-02-04 NOTE — Clinical Note
Please call pt and let her know her vitamin B12 level is low so I sent in a prescription for vitamin B supplements to take daily.Thanks!Juan Ramon

## 2020-02-04 NOTE — PROGRESS NOTES
Assessment and Plan        Bridgette was seen today for recheck.    Diagnoses and all orders for this visit:    Aphthous Stomatitis and mucositis of mouth  Given recurrent ulcers despite good adherence to symptomatic treatment, will do further work-up to evaluate for nutritional deficiencies, sed rate to evaluate for any inflammatory disorders, as well as ENT referral for further management.  Given her shotty lymphadenopathy will treat for possible dental infection, although no obvious infection noted on exam.  -     Folate  -     Iron and iron binding capacity  -     Vitamin B12  -     OTOLARYNGOLOGY REFERRAL - INTERNAL  -     Erythrocyte Sedimentation Rate ([place])  -     amoxicillin (AMOXIL) 500 MG capsule; Take 1 capsule (500 mg) by mouth 2 times daily    Dry eyes  Requested refill.  -     carboxymethylcellulose (REFRESH PLUS) 0.5 % SOLN ophthalmic solution; Place 1 drop into both eyes daily as needed for dry eyes    Cough  Symptoms not consistent with allergic rhinitis or asthma.  May be related to infection as noted above.  Will treat symptomatically as below.  -     benzonatate (TESSALON) 100 MG capsule; Take 1 capsule (100 mg) by mouth 3 times daily as needed for cough  -     dextromethorphan (DELSYM) 30 MG/5ML liquid; Take 10 mLs (60 mg) by mouth 2 times daily           HPI     Bridgette Carl is a 59 year old year old female with a history of  has a past medical history of MR (mental retardation). who presents for RECHECK    Cough since a week. Only at night. No shortness of breath.  No history of seasonal allergies, denies any rhinitis, itchy eyes, conjunctivitis.  She does report right-sided neck pain and pain with swallowing.  She has a right lower lip ulcer that she says is been there for about a month, and is very slowly improving.  Her last visit she was seen for recurrent aphthous ulcers and was given Magic mouthwash and Decadron mouthwash, which she has been using regularly.  This helps but is not  "prevented further recurrence of her ulcers.    A Israeli  was used for  this visit.    +++++++    Problem, Medication and Allergy Lists were reviewed and updated if needed.    Patient is an established patient of this clinic.         Review of Systems:   Review of Systems  A 6 point review of systems was performed and was negative except as noted above.         Physical Exam:   /74   Pulse 70   Temp 97.6  F (36.4  C) (Oral)   Resp 16   Ht 1.57 m (5' 1.81\")   Wt 39.5 kg (87 lb)   SpO2 100%   BMI 16.01 kg/m    Body mass index is 16.01 kg/m .    Vitals were reviewed and were normal     Physical Exam  Constitutional:       General: She is not in acute distress.     Appearance: She is well-developed.   HENT:      Head: Normocephalic and atraumatic.      Mouth/Throat:      Mouth: Mucous membranes are moist.      Pharynx: Oropharynx is clear. No oropharyngeal exudate.      Comments: 2 mm clear ulceration noted on right lower lip  Eyes:      General: No scleral icterus.     Conjunctiva/sclera: Conjunctivae normal.   Neck:      Musculoskeletal: Normal range of motion. Muscular tenderness (right side anterior chain) present.   Cardiovascular:      Rate and Rhythm: Normal rate and regular rhythm.      Heart sounds: Normal heart sounds. No murmur. No friction rub.   Pulmonary:      Effort: Pulmonary effort is normal. No respiratory distress.      Breath sounds: Normal breath sounds.   Abdominal:      General: Bowel sounds are normal.      Palpations: Abdomen is soft.      Tenderness: There is no abdominal tenderness. There is no guarding.   Lymphadenopathy:      Cervical: Cervical adenopathy (shotty) present.   Skin:     General: Skin is warm and dry.      Findings: No erythema.   Neurological:      Mental Status: She is alert and oriented to person, place, and time.           Results:   Results are ordered and pending     There are no discontinued medications.    Options for treatment and follow-up " care were reviewed with the patient. Bridgette Carl engaged in the decision making process and verbalized understanding of the options discussed and agreed with the final plan.    Juan Ramon Andrade MD

## 2020-02-06 ENCOUNTER — TELEPHONE (OUTPATIENT)
Dept: FAMILY MEDICINE | Facility: CLINIC | Age: 60
End: 2020-02-06

## 2020-02-06 RX ORDER — LANOLIN ALCOHOL/MO/W.PET/CERES
1000 CREAM (GRAM) TOPICAL DAILY
Qty: 30 TABLET | Refills: 3 | Status: SHIPPED | OUTPATIENT
Start: 2020-02-06 | End: 2020-06-12

## 2020-02-06 NOTE — LETTER
Bridgette Carl  [address]  [facility] [address]        Dear Bridgette,    We have attempted to reach you by telephone, but were unable to get hold of you.  Your provider would like you to know that your recent  Vitamin B12 level was low and a prescription for vitamin B supplements to take daily was sent to your local pharmacy.    Best Regards.

## 2020-02-06 NOTE — TELEPHONE ENCOUNTER
"Called patient via language line with Cantonese , to relay provider's message unable to get hold, left message to call back the clinic, if patient returns phone call please relay message per PCP, \"Vitamin B12 level is low so I sent in a prescription for vitamin B supplements to take daily\", MD Lupe.    Joann Lauren RN    "

## 2020-02-07 ENCOUNTER — DOCUMENTATION ONLY (OUTPATIENT)
Dept: CARE COORDINATION | Facility: CLINIC | Age: 60
End: 2020-02-07

## 2020-02-13 NOTE — TELEPHONE ENCOUNTER
Called Patient to relay provider's message and results via language line, Cantonese , unable to get hold, left message to call back the clinic, second attempt sending letter with results and provider's message.    Joann Lauren RN

## 2020-02-18 ENCOUNTER — PRE VISIT (OUTPATIENT)
Dept: OTOLARYNGOLOGY | Facility: CLINIC | Age: 60
End: 2020-02-18

## 2020-06-12 DIAGNOSIS — K12.30 STOMATITIS AND MUCOSITIS: ICD-10-CM

## 2020-06-12 DIAGNOSIS — K12.1 STOMATITIS AND MUCOSITIS: ICD-10-CM

## 2020-06-12 RX ORDER — LANOLIN ALCOHOL/MO/W.PET/CERES
1000 CREAM (GRAM) TOPICAL DAILY
Qty: 30 TABLET | Refills: 3 | Status: SHIPPED | OUTPATIENT
Start: 2020-06-12 | End: 2020-10-20

## 2020-07-09 DIAGNOSIS — G47.00 INSOMNIA, UNSPECIFIED TYPE: ICD-10-CM

## 2020-07-09 NOTE — TELEPHONE ENCOUNTER
"Request for medication refill: traZODone (DESYREL) 50 MG tablet     Providers if patient needs an appointment and you are willing to give a one month supply please refill for one month and  send a letter/MyChart using \".SMILLIMITEDREFILL\" .smillimited and route chart to \"P [facility] \" (Giving one month refill in non controlled medications is strongly recommended before denial)    If refill has been denied, meaning absolutely no refills without visit, please complete the smart phrase \".smirxrefuse\" and route it to the \"P [facility] MED REFILLS\"  pool to inform the patient and the pharmacy.    Sheila Cottrell, [facility]        "

## 2020-07-10 RX ORDER — TRAZODONE HYDROCHLORIDE 50 MG/1
50 TABLET, FILM COATED ORAL
Qty: 30 TABLET | Refills: 11 | Status: SHIPPED | OUTPATIENT
Start: 2020-07-10 | End: 2021-10-03

## 2020-08-18 DIAGNOSIS — K64.4 EXTERNAL HEMORRHOIDS: ICD-10-CM

## 2020-08-18 RX ORDER — LIDOCAINE HYDROCHLORIDE 20 MG/ML
JELLY TOPICAL 2 TIMES DAILY PRN
Qty: 30 ML | Refills: 1 | Status: SHIPPED | OUTPATIENT
Start: 2020-08-18 | End: 2022-01-26

## 2020-08-18 NOTE — TELEPHONE ENCOUNTER
Lidocaine last filled 1/21/2020 for #30gm    Thank you,  Nery Rivero, PharmD  [facility]  [phone]

## 2020-10-19 DIAGNOSIS — K12.1 STOMATITIS AND MUCOSITIS: ICD-10-CM

## 2020-10-19 DIAGNOSIS — K12.30 STOMATITIS AND MUCOSITIS: ICD-10-CM

## 2020-10-19 NOTE — TELEPHONE ENCOUNTER

## 2020-10-20 RX ORDER — LANOLIN ALCOHOL/MO/W.PET/CERES
1000 CREAM (GRAM) TOPICAL DAILY
Qty: 30 TABLET | Refills: 3 | Status: SHIPPED | OUTPATIENT
Start: 2020-10-20 | End: 2021-02-23

## 2020-12-10 DIAGNOSIS — M41.20 SCOLIOSIS (AND KYPHOSCOLIOSIS), IDIOPATHIC: ICD-10-CM

## 2020-12-10 NOTE — TELEPHONE ENCOUNTER
Tylenol 500mg last filled 9/3/2020 for #100    Thank you,    Nery Rivero, PharmD  [facility]  [phone]

## 2020-12-11 RX ORDER — ACETAMINOPHEN 500 MG
500-1000 TABLET ORAL EVERY 6 HOURS PRN
Qty: 100 TABLET | Refills: 5 | Status: SHIPPED | OUTPATIENT
Start: 2020-12-11 | End: 2022-01-26

## 2021-02-23 DIAGNOSIS — K12.30 STOMATITIS AND MUCOSITIS: ICD-10-CM

## 2021-02-23 DIAGNOSIS — K12.1 STOMATITIS AND MUCOSITIS: ICD-10-CM

## 2021-02-23 RX ORDER — LANOLIN ALCOHOL/MO/W.PET/CERES
1000 CREAM (GRAM) TOPICAL DAILY
Qty: 30 TABLET | Refills: 3 | Status: SHIPPED | OUTPATIENT
Start: 2021-02-23 | End: 2021-10-13

## 2021-02-23 NOTE — TELEPHONE ENCOUNTER
"Request for medication refill: Vitamin b12 1000mcg    Providers if patient needs an appointment and you are willing to give a one month supply please refill for one month and  send a letter/MyChart using \".SMILLIMITEDREFILL\" .smillimited and route chart to \"P [facility] \" (Giving one month refill in non controlled medications is strongly recommended before denial)    If refill has been denied, meaning absolutely no refills without visit, please complete the smart phrase \".smirxrefuse\" and route it to the \"P [facility] MED REFILLS\"  pool to inform the patient and the pharmacy.    Liat [place]        "

## 2021-02-24 ENCOUNTER — VIRTUAL VISIT (OUTPATIENT)
Dept: FAMILY MEDICINE | Facility: CLINIC | Age: 61
End: 2021-02-24
Payer: COMMERCIAL

## 2021-02-24 DIAGNOSIS — E55.9 VITAMIN D DEFICIENCY: ICD-10-CM

## 2021-02-24 DIAGNOSIS — R09.81 NASAL CONGESTION: Primary | ICD-10-CM

## 2021-02-24 DIAGNOSIS — K59.09 CHRONIC CONSTIPATION: ICD-10-CM

## 2021-02-24 PROCEDURE — 99213 OFFICE O/P EST LOW 20 MIN: CPT | Mod: 95 | Performed by: STUDENT IN AN ORGANIZED HEALTH CARE EDUCATION/TRAINING PROGRAM

## 2021-02-24 RX ORDER — ECHINACEA PURPUREA EXTRACT 125 MG
TABLET ORAL
Qty: 88 ML | Refills: 1 | Status: SHIPPED | OUTPATIENT
Start: 2021-02-24 | End: 2022-10-25

## 2021-02-24 RX ORDER — FLUTICASONE PROPIONATE 50 MCG
1-2 SPRAY, SUSPENSION (ML) NASAL 2 TIMES DAILY PRN
Qty: 15.8 ML | Refills: 1 | Status: SHIPPED | OUTPATIENT
Start: 2021-02-24

## 2021-02-24 RX ORDER — ASPIRIN 81 MG
100 TABLET, DELAYED RELEASE (ENTERIC COATED) ORAL 2 TIMES DAILY PRN
Qty: 60 TABLET | Refills: 11 | Status: SHIPPED | OUTPATIENT
Start: 2021-02-24 | End: 2021-10-13

## 2021-02-24 NOTE — PROGRESS NOTES
Preceptor Attestation:   I discussed the patient with the resident. I talked to the patient on the phone. I have verified the content of the note, which accurately reflects my assessment of the patient and the plan of care.   Supervising Physician:  Isis Brown MD.

## 2021-02-24 NOTE — PROGRESS NOTES
Family Medicine Telephone Visit Note  Chief Complaint   Patient presents with     Pain     first noticed mucous in throat three weeks ago. when patient wakes up one side of her nose is plugged. had a cough one month ago, cough has gone away mostly but still feels like there is mucous in her throat. when she lays down on her side, she notices the mucous in her mouth when she waks up.      Refill Request     colace and vitamin D      Cantonese  used with [employer]         HPI   Patients name: Bridgette  Appointment start time:  11:54 AM    Concern: Runny nose  Per patient when she lays down at night mucus from nose goes down throat  Onset: for the last 3 weeks  Other symptoms: No cough anymore as taking medication, cough was productive; Sometimes gets congested with some pressure on left side, no ear pain, no fever or chills, having postnasal drip, no sore throat, no trouble breathing  Mucous is yellowish-green   Has tried: drinking emergency C and using ocean spray  Has had before years ago, was given a steroid, which helped   No hx of asthma or COPD  Sometimes have itchy eyes and winter irritation       Current Outpatient Medications   Medication Sig Dispense Refill     acetaminophen (TYLENOL) 500 MG tablet Take 1-2 tablets (500-1,000 mg) by mouth every 6 hours as needed for mild pain 100 tablet 5     ammonium lactate (AMLACTIN) 12 % cream Apply topically daily 140 g 11     amoxicillin (AMOXIL) 500 MG capsule Take 1 capsule (500 mg) by mouth 2 times daily 14 capsule 0     carboxymethylcellulose (REFRESH PLUS) 0.5 % SOLN ophthalmic solution Place 1 drop into both eyes daily as needed for dry eyes 1 Bottle 11     cyanocobalamin (VITAMIN B-12) 1000 MCG tablet Take 1 tablet (1,000 mcg) by mouth daily 30 tablet 3     docusate sodium (COLACE) 100 MG tablet Take 1 tablet (100 mg) by mouth 2 times daily as needed for constipation 60 tablet 11     fluticasone (FLONASE) 50 MCG/ACT nasal spray Spray 1-2 sprays into both  nostrils 2 times daily as needed for rhinitis or allergies 15.8 mL 1     hydrocortisone (ANUSOL-HC) 25 MG suppository Place 1 suppository (25 mg) rectally 2 times daily as needed for hemorrhoids 28 suppository 11     hydrocortisone 2.5 % cream Apply topically every night before bed for hemorrhoids       sodium chloride (OCEAN) 0.65 % nasal spray 1-2 sprays in each nostril as needed 1-2 times a day 88 mL 1     traZODone (DESYREL) 50 MG tablet Take 1 tablet (50 mg) by mouth nightly as needed for sleep 30 tablet 11     vitamin D3 (CHOLECALCIFEROL) 1.25 MG (55065 UT) capsule Take 1 capsule (50,000 Units) by mouth once a week 16 capsule 0     benzonatate (TESSALON) 100 MG capsule Take 1 capsule (100 mg) by mouth 3 times daily as needed for cough (Patient not taking: Reported on 2/24/2021) 15 capsule 0     dexamethasone (DECADRON) 0.5 MG/5ML elixir Take 5 mLs (0.5 mg) by mouth 2 times daily Swish and spit 5ml twice a day. Should keep the solution in your mouth for 5 minutes before spitting. (Patient not taking: Reported on 2/24/2021) 140 mL 0     dextromethorphan (DELSYM) 30 MG/5ML liquid Take 10 mLs (60 mg) by mouth 2 times daily (Patient not taking: Reported on 2/24/2021) 148 mL 0     lidocaine (XYLOCAINE) 2 % external gel Apply topically 2 times daily as needed for moderate pain (Patient not taking: Reported on 2/24/2021) 30 mL 1     magic mouthwash suspension (diphenhydrAMINE, lidocaine, aluminum-magnesium & simethicone) Swish and spit 10 mLs in mouth every 6 hours as needed for mouth sores (Patient not taking: Reported on 2/24/2021) 1 Bottle 0     sennosides (SENOKOT) 8.6 MG tablet Take 1 tablet by mouth daily (Patient not taking: Reported on 12/3/2019) 90 tablet 1     Allergies   Allergen Reactions     Bee Venom Itching     Fish-Derived Products Itching              Review of Systems:     Constitutional, HEENT, cardiovascular, pulmonary, gi and gu systems are negative, except as otherwise noted.         Physical  "Exam:     There were no vitals taken for this visit.  Estimated body mass index is 16.01 kg/m  as calculated from the following:    Height as of 2/4/20: 1.57 m (5' 1.81\").    Weight as of 2/4/20: 39.5 kg (87 lb).    Exam:  Constitutional: healthy, alert and no distress,  Facial: no TTP by patient of facial or maxillary sinuses   Resp: speaking in full sentences, no stridor or wheezing heard over phone  Psychiatric: mentation appears normal and affect normal/bright          Assessment and Plan   1. Nasal congestion  Likely cold or allergy flare, as less then 4-6 weeks will stick to conservative management and unable to do a physical exam. Follow up in 1 week if not improving in person.  - fluticasone (FLONASE) 50 MCG/ACT nasal spray; Spray 1-2 sprays into both nostrils 2 times daily as needed for rhinitis or allergies  Dispense: 15.8 mL; Refill: 1  - sodium chloride (OCEAN) 0.65 % nasal spray; 1-2 sprays in each nostril as needed 1-2 times a day  Dispense: 88 mL; Refill: 1    2. Chronic constipation  - docusate sodium (COLACE) 100 MG tablet; Take 1 tablet (100 mg) by mouth 2 times daily as needed for constipation  Dispense: 60 tablet; Refill: 11    3. Vitamin D deficiency  - vitamin D3 (CHOLECALCIFEROL) 1.25 MG (66439 UT) capsule; Take 1 capsule (50,000 Units) by mouth once a week  Dispense: 16 capsule; Refill: 0    Refilled medications that would be required in the next 3 months.     After Visit Information:  Patient declined AVS    No follow-ups on file.    Appointment end time: 12:14 PM  This is a telephone visit that took 20 minutes.      Clinician location:  [facility]     Jessi Mcwilliams MD  I precepted today with Dr. Brown.        "

## 2021-04-21 ENCOUNTER — IMMUNIZATION (OUTPATIENT)
Dept: NURSING | Facility: CLINIC | Age: 61
End: 2021-04-21
Payer: COMMERCIAL

## 2021-04-21 PROCEDURE — 91300 PR COVID VAC PFIZER DIL RECON 30 MCG/0.3 ML IM: CPT

## 2021-04-21 PROCEDURE — 0001A PR COVID VAC PFIZER DIL RECON 30 MCG/0.3 ML IM: CPT

## 2021-05-12 ENCOUNTER — IMMUNIZATION (OUTPATIENT)
Dept: NURSING | Facility: CLINIC | Age: 61
End: 2021-05-12
Attending: FAMILY MEDICINE
Payer: COMMERCIAL

## 2021-05-12 PROCEDURE — 0002A PR COVID VAC PFIZER DIL RECON 30 MCG/0.3 ML IM: CPT

## 2021-05-12 PROCEDURE — 91300 PR COVID VAC PFIZER DIL RECON 30 MCG/0.3 ML IM: CPT

## 2021-10-01 DIAGNOSIS — G47.00 INSOMNIA, UNSPECIFIED TYPE: ICD-10-CM

## 2021-10-01 NOTE — TELEPHONE ENCOUNTER
"Request for medication refill:traZODone (DESYREL) 50 MG tablet    Providers if patient needs an appointment and you are willing to give a one month supply please refill for one month and  send a letter/MyChart using \".SMILLIMITEDREFILL\" .smillimited and route chart to \"P [facility] \" (Giving one month refill in non controlled medications is strongly recommended before denial)    If refill has been denied, meaning absolutely no refills without visit, please complete the smart phrase \".smirxrefuse\" and route it to the \"P [facility] MED REFILLS\"  pool to inform the patient and the pharmacy.    Sofia Silva        "

## 2021-10-03 RX ORDER — TRAZODONE HYDROCHLORIDE 50 MG/1
50 TABLET, FILM COATED ORAL
Qty: 30 TABLET | Refills: 11 | Status: SHIPPED | OUTPATIENT
Start: 2021-10-03 | End: 2021-10-13

## 2021-10-13 ENCOUNTER — OFFICE VISIT (OUTPATIENT)
Dept: FAMILY MEDICINE | Facility: CLINIC | Age: 61
End: 2021-10-13
Payer: COMMERCIAL

## 2021-10-13 VITALS
HEART RATE: 79 BPM | HEIGHT: 61 IN | BODY MASS INDEX: 16.65 KG/M2 | SYSTOLIC BLOOD PRESSURE: 90 MMHG | TEMPERATURE: 97.9 F | OXYGEN SATURATION: 99 % | WEIGHT: 88.2 LBS | DIASTOLIC BLOOD PRESSURE: 59 MMHG

## 2021-10-13 DIAGNOSIS — G47.00 INSOMNIA, UNSPECIFIED TYPE: ICD-10-CM

## 2021-10-13 DIAGNOSIS — K59.09 CHRONIC CONSTIPATION: ICD-10-CM

## 2021-10-13 DIAGNOSIS — E55.9 VITAMIN D DEFICIENCY: ICD-10-CM

## 2021-10-13 DIAGNOSIS — R63.4 WEIGHT LOSS: ICD-10-CM

## 2021-10-13 DIAGNOSIS — R63.6 UNDERWEIGHT: ICD-10-CM

## 2021-10-13 DIAGNOSIS — Z00.00 ROUTINE GENERAL MEDICAL EXAMINATION AT A HEALTH CARE FACILITY: Primary | ICD-10-CM

## 2021-10-13 DIAGNOSIS — Z23 NEED FOR PROPHYLACTIC VACCINATION AND INOCULATION AGAINST INFLUENZA: ICD-10-CM

## 2021-10-13 DIAGNOSIS — K64.4 EXTERNAL HEMORRHOIDS: ICD-10-CM

## 2021-10-13 DIAGNOSIS — K12.1 STOMATITIS AND MUCOSITIS: ICD-10-CM

## 2021-10-13 DIAGNOSIS — K12.30 STOMATITIS AND MUCOSITIS: ICD-10-CM

## 2021-10-13 LAB — VIT B12 SERPL-MCNC: 240 PG/ML (ref 193–986)

## 2021-10-13 PROCEDURE — 90471 IMMUNIZATION ADMIN: CPT | Performed by: STUDENT IN AN ORGANIZED HEALTH CARE EDUCATION/TRAINING PROGRAM

## 2021-10-13 PROCEDURE — 36415 COLL VENOUS BLD VENIPUNCTURE: CPT | Performed by: STUDENT IN AN ORGANIZED HEALTH CARE EDUCATION/TRAINING PROGRAM

## 2021-10-13 PROCEDURE — 82746 ASSAY OF FOLIC ACID SERUM: CPT | Performed by: STUDENT IN AN ORGANIZED HEALTH CARE EDUCATION/TRAINING PROGRAM

## 2021-10-13 PROCEDURE — 86803 HEPATITIS C AB TEST: CPT | Performed by: STUDENT IN AN ORGANIZED HEALTH CARE EDUCATION/TRAINING PROGRAM

## 2021-10-13 PROCEDURE — 82607 VITAMIN B-12: CPT | Performed by: STUDENT IN AN ORGANIZED HEALTH CARE EDUCATION/TRAINING PROGRAM

## 2021-10-13 PROCEDURE — 99213 OFFICE O/P EST LOW 20 MIN: CPT | Mod: 25 | Performed by: STUDENT IN AN ORGANIZED HEALTH CARE EDUCATION/TRAINING PROGRAM

## 2021-10-13 PROCEDURE — 99396 PREV VISIT EST AGE 40-64: CPT | Mod: 25 | Performed by: STUDENT IN AN ORGANIZED HEALTH CARE EDUCATION/TRAINING PROGRAM

## 2021-10-13 PROCEDURE — 90682 RIV4 VACC RECOMBINANT DNA IM: CPT | Performed by: STUDENT IN AN ORGANIZED HEALTH CARE EDUCATION/TRAINING PROGRAM

## 2021-10-13 RX ORDER — HYDROCORTISONE ACETATE 25 MG/1
25 SUPPOSITORY RECTAL 2 TIMES DAILY PRN
Qty: 28 SUPPOSITORY | Refills: 11 | Status: CANCELLED | OUTPATIENT
Start: 2021-10-13

## 2021-10-13 RX ORDER — SENNOSIDES 8.6 MG
1 TABLET ORAL DAILY
Qty: 90 TABLET | Refills: 1 | Status: SHIPPED | OUTPATIENT
Start: 2021-10-13 | End: 2022-06-20

## 2021-10-13 RX ORDER — IBUPROFEN 600 MG/1
600 TABLET, FILM COATED ORAL EVERY 6 HOURS PRN
Qty: 30 TABLET | Refills: 4 | Status: SHIPPED | OUTPATIENT
Start: 2021-10-13 | End: 2022-06-20

## 2021-10-13 RX ORDER — TRAZODONE HYDROCHLORIDE 50 MG/1
50 TABLET, FILM COATED ORAL
Qty: 30 TABLET | Refills: 11 | Status: SHIPPED | OUTPATIENT
Start: 2021-10-13 | End: 2022-11-04

## 2021-10-13 RX ORDER — HYDROCORTISONE 2.5 %
CREAM (GRAM) TOPICAL 2 TIMES DAILY
Qty: 3.5 G | Refills: 1 | Status: SHIPPED | OUTPATIENT
Start: 2021-10-13 | End: 2021-10-14

## 2021-10-13 RX ORDER — CHOLECALCIFEROL (VITAMIN D3) 50 MCG
1 TABLET ORAL DAILY
Qty: 30 TABLET | Refills: 3 | Status: SHIPPED | OUTPATIENT
Start: 2021-10-13 | End: 2022-03-24

## 2021-10-13 RX ORDER — LANOLIN ALCOHOL/MO/W.PET/CERES
1000 CREAM (GRAM) TOPICAL DAILY
Qty: 30 TABLET | Refills: 3 | Status: SHIPPED | OUTPATIENT
Start: 2021-10-13 | End: 2022-10-25

## 2021-10-13 SDOH — ECONOMIC STABILITY: TRANSPORTATION INSECURITY
IN THE PAST 12 MONTHS, HAS THE LACK OF TRANSPORTATION KEPT YOU FROM MEDICAL APPOINTMENTS OR FROM GETTING MEDICATIONS?: NO

## 2021-10-13 SDOH — ECONOMIC STABILITY: TRANSPORTATION INSECURITY
IN THE PAST 12 MONTHS, HAS LACK OF TRANSPORTATION KEPT YOU FROM MEETINGS, WORK, OR FROM GETTING THINGS NEEDED FOR DAILY LIVING?: NO

## 2021-10-13 SDOH — ECONOMIC STABILITY: INCOME INSECURITY: IN THE LAST 12 MONTHS, WAS THERE A TIME WHEN YOU WERE NOT ABLE TO PAY THE MORTGAGE OR RENT ON TIME?: NO

## 2021-10-13 ASSESSMENT — MIFFLIN-ST. JEOR: SCORE: 897.45

## 2021-10-13 ASSESSMENT — SOCIAL DETERMINANTS OF HEALTH (SDOH): HOW HARD IS IT FOR YOU TO PAY FOR THE VERY BASICS LIKE FOOD, HOUSING, MEDICAL CARE, AND HEATING?: NOT HARD AT ALL

## 2021-10-13 NOTE — LETTER
October 15, 2021      Bridgette Carl  [address]  [facility] [address]        Dear ,    We are writing to inform you of your test results.    Your test results fall within the expected range(s) or remain unchanged from previous results.  Please continue with current treatment plan. Specifically, your Hep C result was negative!    Resulted Orders   Vitamin B12   Result Value Ref Range    Vitamin B12 240 193 - 986 pg/mL   Folate   Result Value Ref Range    Folic Acid 18.9 >=5.4 ng/mL      Comment:      Deficient: <3.4 ng/mL  Indeterminate: 3.4-5.4 ng/mL  Normal: > 5.4 ng/mL   Hepatitis C Screen Reflex to HCV RNA Quant and Genotype   Result Value Ref Range    Hepatitis C Antibody Nonreactive Nonreactive    Narrative    Assay performance characteristics have not been established for newborns, infants, and children.       If you have any questions or concerns, please call the clinic at the number listed above.       Sincerely,      Jayne Lion MD

## 2021-10-13 NOTE — PROGRESS NOTES
Female Physical Note          HPI         Concerns today: Patient has an established history of intellectual disability, and is accompanied by her sister who is very involved in her care.     Patient's sister is inquiring about Hep C testing. Patient does not have any risk factors for Hep C, however, patient's mother passed away from complications of Hep C a few years ago, and therefore her sister would like her to be tested as well.     Patient Active Problem List   Diagnosis     Health Care Home     Degenerative disorder of globe     Insomnia     Latent tuberculosis     Nuclear Senile Cataract      Scoliosis     Underweight     Osteopenia     Rash and nonspecific skin eruption     Dry skin       Past Medical History:   Diagnosis Date     Intellectual disability         Family History   Problem Relation Age of Onset     Anesthesia Reaction No family hx of      Colon Polyps No family hx of      Cancer - colorectal No family hx of      Ulcerative Colitis No family hx of      Crohn's Disease No family hx of               Review of Systems:     Review of Systems:  CONSTITUTIONAL: NEGATIVE for fever, chills, change in weight  INTEGUMENTARY/SKIN: NEGATIVE for worrisome rashes, moles or lesions  EYES: NEGATIVE for vision changes or irritation  RESP: NEGATIVE for significant cough or SOB  CV: NEGATIVE for chest pain, palpitations or peripheral edema  GI: NEGATIVE for nausea, abdominal pain, POSITIVE for chronic constipation  : NEGATIVE for frequency, dysuria, or hematuria  MUSCULOSKELETAL: NEGATIVE for significant arthralgias or myalgia  NEURO: NEGATIVE for weakness, dizziness or paresthesias  ENDOCRINE: NEGATIVE for temperature intolerance, skin/hair changes  HEME/ALLERGY: NEGATIVE for bleeding problems  PSYCHIATRIC: NEGATIVE for changes in mood or affect  Sleep:   Do you snore most or the night (as reported by a family member)? No  Do you feel sleepy or extremely tired during most of the day? No         Social  History     Social History     Socioeconomic History     Marital status: Single     Spouse name: Not on file     Number of children: 0     Years of education: Not on file     Highest education level: Not on file   Occupational History     Not on file   Tobacco Use     Smoking status: Never Smoker     Smokeless tobacco: Never Used   Substance and Sexual Activity     Alcohol use: No     Drug use: No     Sexual activity: Not on file   Other Topics Concern     Not on file   Social History Narrative     Not on file     Social Determinants of Health     Financial Resource Strain: Low Risk      Difficulty of Paying Living Expenses: Not hard at all   Food Insecurity: No Food Insecurity     Worried About Running Out of Food in the Last Year: Never true     Ran Out of Food in the Last Year: Never true   Transportation Needs: No Transportation Needs     Lack of Transportation (Medical): No     Lack of Transportation (Non-Medical): No   Physical Activity:      Days of Exercise per Week:      Minutes of Exercise per Session:    Stress:      Feeling of Stress :    Social Connections:      Frequency of Communication with Friends and Family:      Frequency of Social Gatherings with Friends and Family:      Attends [employer] Services:      Active Member of Clubs or Organizations:      Attends Club or Organization Meetings:      Marital Status:    Intimate Partner Violence: Not At Risk     Fear of Current or Ex-Partner: No     Emotionally Abused: No     Physically Abused: No     Sexually Abused: No       Marital Status: Single  Who lives in your household? Lives alone, but has a PCA to help with day to day needs. Sister also checks in regularly and prepares meals.     Has anyone hurt you physically, for example by pushing, hitting, slapping or kicking you or forcing you to have sex? Denies  Do you feel threatened or controlled by a partner, ex-partner or anyone in your life? Denies    Sexual Health     Sexual concerns: No   STI  "History: Neg  Pregnancy History: No obstetric history on file.  LMP No LMP recorded. Patient is postmenopausal.   Last Pap Smear Date: patient has never had a pap smear done, and does not plan to    Recommended Screening     Cholesterol Level (>46 yo or at risk):  Testing not indicated   Colon CA Screening (>50-75 ):  Recommended and patient accepted testing (FITT test)  Mammogram: Recommended and patient declined         Physical Exam:     Vitals: BP 90/59   Pulse 79   Temp 97.9  F (36.6  C) (Oral)   Ht 1.549 m (5' 1\")   Wt 40 kg (88 lb 3.2 oz)   SpO2 99%   BMI 16.67 kg/m    BMI= Body mass index is 16.67 kg/m .   GENERAL: healthy, alert and no distress  EYES: Eyes grossly normal to inspection, extraocular movements - intact, and PERRL  HENT: Nose- normal; Mouth- no ulcers, no lesions  NECK: no tenderness, no adenopathy, no asymmetry, no masses, no stiffness; thyroid- normal to palpation  RESP: lungs clear to auscultation - no rales, no rhonchi, no wheezes  CV: regular rates and rhythm, normal S1 S2, no S3 or S4 and no murmur, no click or rub -  ABDOMEN: soft, no tenderness, no  hepatosplenomegaly, no masses, normal bowel sounds  MS: extremities- no gross deformities noted, no edema  SKIN: no suspicious lesions, no rashes  NEURO: strength and tone- normal, sensory exam- grossly normal  BACK: no CVA tenderness, no paralumbar tenderness    Assessment and Plan      Bridgette was seen today for physical, medication request and imm/inj.    Diagnoses and all orders for this visit:    Underweight  Weight loss  BMI 16.67  6lb weight loss since 07/2019. Patient's sister helps prepare her meals. Notes slightly decreased appetite over the last few months, but has been eating 3 meals/day. Patient has never completed a screening colonoscopy.   -     Vitamin B12  -     Folate  -     Fecal colorectal cancer screen FITT. Patient declined colonoscopy today, but amenable to FITT testing.    External hemorrhoids  Chronic " constipation  -     ibuprofen (ADVIL/MOTRIN) 600 MG tablet; Take 1 tablet (600 mg) by mouth every 6 hours as needed for moderate pain  -     sennosides (SENOKOT) 8.6 MG tablet; Take 1 tablet by mouth daily  -     hydrocortisone, Perianal, (HYDROCORTISONE) 2.5 % cream; Place rectally 2 times daily as needed for hemorrhoids  -     glycerin (LAXATIVE) 1.2 g suppository; Place 1 suppository rectally once as needed (constipation)    Insomnia, unspecified type  -     traZODone (DESYREL) 50 MG tablet; Take 1 tablet (50 mg) by mouth nightly as needed for sleep    Vitamin D deficiency  -     vitamin D3 (CHOLECALCIFEROL) 50 mcg (2000 units) tablet;  Take 1 tablet (50 mcg) by mouth daily    Routine general medical examination at a health care facility  Need for prophylactic vaccination and inoculation against influenza  -     INFLUENZA QUAD, RECOMBINANT, P-FREE (RIV4) (FLUBLOK)  -     Hepatitis C Screen Reflex to HCV RNA Quant and Genotype      Options for treatment and follow-up care were reviewed with the patient . Bridgette Carl and/or guardian engaged in the decision making process and verbalized understanding of the options discussed and agreed with the final plan.    Jayne Lion MD

## 2021-10-14 LAB
FOLATE SERPL-MCNC: 18.9 NG/ML
HCV AB SERPL QL IA: NONREACTIVE

## 2021-10-14 RX ORDER — HYDROCORTISONE 25 MG/G
CREAM TOPICAL 2 TIMES DAILY PRN
Qty: 28 G | Refills: 1 | Status: SHIPPED | OUTPATIENT
Start: 2021-10-14 | End: 2021-10-21

## 2021-10-20 ENCOUNTER — LAB (OUTPATIENT)
Dept: LAB | Facility: CLINIC | Age: 61
End: 2021-10-20
Payer: COMMERCIAL

## 2021-10-20 ENCOUNTER — HOSPITAL ENCOUNTER (OUTPATIENT)
Facility: CLINIC | Age: 61
Discharge: HOME | End: 2021-10-20
Admitting: STUDENT IN AN ORGANIZED HEALTH CARE EDUCATION/TRAINING PROGRAM
Payer: COMMERCIAL

## 2021-10-20 DIAGNOSIS — R63.4 WEIGHT LOSS: ICD-10-CM

## 2021-10-20 PROCEDURE — 82274 ASSAY TEST FOR BLOOD FECAL: CPT

## 2021-10-20 SDOH — ECONOMIC STABILITY: FOOD INSECURITY: WITHIN THE PAST 12 MONTHS, THE FOOD YOU BOUGHT JUST DIDN'T LAST AND YOU DIDN'T HAVE MONEY TO GET MORE.: NEVER TRUE

## 2021-10-20 SDOH — ECONOMIC STABILITY: FOOD INSECURITY: WITHIN THE PAST 12 MONTHS, YOU WORRIED THAT YOUR FOOD WOULD RUN OUT BEFORE YOU GOT MONEY TO BUY MORE.: NEVER TRUE

## 2021-10-22 LAB — HEMOCCULT STL QL IA: NEGATIVE

## 2021-12-22 ENCOUNTER — IMMUNIZATION (OUTPATIENT)
Dept: FAMILY MEDICINE | Facility: CLINIC | Age: 61
End: 2021-12-22
Payer: COMMERCIAL

## 2021-12-22 DIAGNOSIS — Z23 HIGH PRIORITY FOR 2019-NCOV VACCINE: Primary | ICD-10-CM

## 2021-12-22 PROCEDURE — 99207 PR NO CHARGE LOS: CPT

## 2021-12-22 PROCEDURE — 0004A COVID-19,PF,PFIZER (12+ YRS): CPT

## 2021-12-22 PROCEDURE — 91300 COVID-19,PF,PFIZER (12+ YRS): CPT

## 2022-01-26 ENCOUNTER — OFFICE VISIT (OUTPATIENT)
Dept: FAMILY MEDICINE | Facility: CLINIC | Age: 62
End: 2022-01-26
Payer: COMMERCIAL

## 2022-01-26 VITALS
TEMPERATURE: 98.4 F | OXYGEN SATURATION: 99 % | DIASTOLIC BLOOD PRESSURE: 65 MMHG | HEART RATE: 89 BPM | SYSTOLIC BLOOD PRESSURE: 99 MMHG

## 2022-01-26 DIAGNOSIS — Z20.822 SUSPECTED 2019 NOVEL CORONAVIRUS INFECTION: Primary | ICD-10-CM

## 2022-01-26 PROCEDURE — U0005 INFEC AGEN DETEC AMPLI PROBE: HCPCS | Performed by: FAMILY MEDICINE

## 2022-01-26 PROCEDURE — U0003 INFECTIOUS AGENT DETECTION BY NUCLEIC ACID (DNA OR RNA); SEVERE ACUTE RESPIRATORY SYNDROME CORONAVIRUS 2 (SARS-COV-2) (CORONAVIRUS DISEASE [COVID-19]), AMPLIFIED PROBE TECHNIQUE, MAKING USE OF HIGH THROUGHPUT TECHNOLOGIES AS DESCRIBED BY CMS-2020-01-R: HCPCS | Performed by: FAMILY MEDICINE

## 2022-01-26 PROCEDURE — 99213 OFFICE O/P EST LOW 20 MIN: CPT | Mod: CS | Performed by: FAMILY MEDICINE

## 2022-01-26 RX ORDER — GUAIFENESIN/DEXTROMETHORPHAN 100-10MG/5
10 SYRUP ORAL EVERY 4 HOURS PRN
Qty: 354 ML | Refills: 1 | Status: SHIPPED | OUTPATIENT
Start: 2022-01-26 | End: 2022-10-25

## 2022-01-26 NOTE — PROGRESS NOTES
Assessment & Plan     Suspected 2019 novel coronavirus infection  Will test because family wanting to know if they might have been exposed. Explained that she will have already exposed everyone and that since family members are not ill at this point, that likely all will be fine. Is unlikely that she is exposing people at this time although ideally she should stay away from people until she is feeling better.   - Symptomatic; Unknown COVID-19 Virus (Coronavirus) by PCR Nose  - guaiFENesin-dextromethorphan (ROBITUSSIN DM) 100-10 MG/5ML syrup; Take 10 mLs by mouth every 4 hours as needed for cough      I spent a total of 24 minutes on the day of the visit.   Time spent doing chart review, history and exam, documentation and further activities per the note           No follow-ups on file.    Nellie Diego MD  [facility] SUBAH Angeles is a 61 year old who presents for the following health issues  accompanied by her sister and .    HPI     Has been sick for more than 10 days.  Has had an itchy throat, runny nose, cough that is worse at night. No fevers.  Is starting to feel a little better.  Denies SOB or CP. No leg swelling.   Wanting to check to see if this is COVID.   Vaccine - Has had 3 Pfizer vaccines.  No known exposure.   Has not had the flu vaccine.               Review of Systems         Objective    BP 99/65   Pulse 89   Temp 98.4  F (36.9  C) (Oral)   SpO2 99%   There is no height or weight on file to calculate BMI.  Physical Exam   GENERAL: healthy, alert and no distress  NECK: no adenopathy, no asymmetry, masses, or scars and thyroid normal to palpation  RESP: lungs clear to auscultation - no rales, rhonchi or wheezes  CV: regular rate and rhythm, normal S1 S2, no S3 or S4, no murmur, click or rub, no peripheral edema and peripheral pulses strong  MS: no gross musculoskeletal defects noted, no edema

## 2022-01-26 NOTE — PATIENT INSTRUCTIONS
[email or web address].covidtests.[email or web address]    Patient Education   Here is the plan from today's visit    1. Suspected 2019 novel coronavirus infection  We will call you if the result is positive.   Call on Friday if you want to get the result  I think you are likely getting better even if this is COVID.  You should stay away from people until you are feeling better.   - Symptomatic; Unknown COVID-19 Virus (Coronavirus) by PCR Nose  - guaiFENesin-dextromethorphan (ROBITUSSIN DM) 100-10 MG/5ML syrup; Take 10 mLs by mouth every 4 hours as needed for cough  Dispense: 354 mL; Refill: 1      Please call or return to clinic if your symptoms don't go away.    Follow up plan  No follow-ups on file.    Thank you for coming to [facility] today.  Lab Testing:  **If you had lab testing today and your results are reassuring or normal they will be mailed to you or sent through eyesFinder within 7 days.   **If the lab tests need quick action we will call you with the results.  **If you are having labs done on a different day, please call [phone] to schedule at [facility] or [phone] for other [facility] Outpatient Lab locations. Labs do not offer walk-in appointments.  The phone number we will call with results is # [phone] (home) [phone] (work). If this is not the best number please call our clinic and change the number.  Medication Refills:  If you need any refills please call your pharmacy and they will contact us.   If you need to  your refill at a new pharmacy, please contact the new pharmacy directly. The new pharmacy will help you get your medications transferred faster.   Scheduling:  If you have any concerns about today's visit or wish to schedule another appointment please call our office during normal business hours [phone] (8-5:00 M-F)  If a referral was made to an [facility] specialty provider and you do not get a call from central scheduling, please refer to directions on your visit  summary or call our office during normal business hours for assistance.   If a Mammogram was ordered for you at the [facility] call [phone] to schedule or change your appointment.  If you had an XRay/CT/Ultrasound/MRI ordered the number is [phone] to schedule or change your radiology appointment.   [facility] has limited ultrasound appointments available on Wednesdays, if you would like your ultrasound at [facility], please call [phone] to schedule.   Medical Concerns:  If you have urgent medical concerns please call [phone] at any time of the day.    Nellie Diego MD

## 2022-01-26 NOTE — LETTER
January 28, 2022      Bridgette Carl  [address]  [place] [address]        Dear ,    We are writing to inform you of your test results.    Your COVID was negative.    Resulted Orders   Symptomatic; Unknown COVID-19 Virus (Coronavirus) by PCR Nose   Result Value Ref Range    SARS CoV2 PCR Negative Negative, Testing sent to reference lab. Results will be returned via unsolicited result      Comment:      NEGATIVE: SARS-CoV-2 (COVID-19) RNA not detected, presumed negative.    Narrative    Testing was performed using the edie SARS-CoV-2 assay on the edie  Sensbeat0 System. This test should be ordered for the detection of  SARS-CoV-2 in individuals who meet SARS-CoV-2 clinical and/or  epidemiological criteria. Test performance is unknown in asymptomatic  patients. This test is for in vitro diagnostic use under the FDA EUA  for laboratories certified under CLIA to perform high and/or moderate  complexity testing. This test has not been FDA cleared or approved. A  negative result does not rule out the presence of PCR inhibitors in  the specimen or target RNA in concentration below the limit of  detection for the assay. The possibility of a false negative should  be considered if the patient's recent exposure or clinical  presentation suggests COVID-19. This test was validated by the [facility]. This  laboratory is certified under the Clinical Laboratory Improvement  Amendments of 1988 (CLIA-88) as qualified to perform high and/or  moderate complexity laboratory testing.       If you have any questions or concerns, please call the clinic at the number listed above.       Sincerely,      Nellie Diego MD

## 2022-01-27 LAB — SARS-COV-2 RNA RESP QL NAA+PROBE: NEGATIVE

## 2022-02-16 ENCOUNTER — TELEPHONE (OUTPATIENT)
Dept: FAMILY MEDICINE | Facility: CLINIC | Age: 62
End: 2022-02-16
Payer: COMMERCIAL

## 2022-02-16 DIAGNOSIS — K64.4 EXTERNAL HEMORRHOIDS: Primary | ICD-10-CM

## 2022-02-16 NOTE — TELEPHONE ENCOUNTER
Refill request:     Perianal 2.5% rectal cream (place rectally bid prn) last filled 12/01/2021 for #30gm    Thank you,    Nery Rivero, PharmD  [facility]  [phone]

## 2022-02-24 RX ORDER — HYDROCORTISONE 25 MG/G
CREAM TOPICAL
COMMUNITY
Start: 2021-12-01 | End: 2022-02-24

## 2022-02-24 RX ORDER — HYDROCORTISONE 25 MG/G
CREAM TOPICAL 2 TIMES DAILY PRN
Qty: 30 G | Refills: 0 | Status: SHIPPED | OUTPATIENT
Start: 2022-02-24 | End: 2022-04-22

## 2022-04-22 DIAGNOSIS — K64.4 EXTERNAL HEMORRHOIDS: ICD-10-CM

## 2022-04-22 RX ORDER — HYDROCORTISONE 25 MG/G
CREAM TOPICAL 2 TIMES DAILY PRN
Qty: 30 G | Refills: 0 | Status: SHIPPED | OUTPATIENT
Start: 2022-04-22 | End: 2022-11-18

## 2022-05-16 DIAGNOSIS — Z00.00 ROUTINE GENERAL MEDICAL EXAMINATION AT A HEALTH CARE FACILITY: ICD-10-CM

## 2022-05-16 NOTE — TELEPHONE ENCOUNTER
"Request for medication refill: vitamin D3 (CHOLECALCIFEROL) 50 mcg (2000 units) tablet    Providers if patient needs an appointment and you are willing to give a one month supply please refill for one month and  send a letter/MyChart using \".SMILLIMITEDREFILL\" .smillimited and route chart to \"[facility] \" (Giving one month refill in non controlled medications is strongly recommended before denial)    If refill has been denied, meaning absolutely no refills without visit, please complete the smart phrase \".smirxrefuse\" and route it to the \"[facility] MED REFILLS\"  pool to inform the patient and the pharmacy.    Sofia Silva        "

## 2022-05-17 RX ORDER — CHOLECALCIFEROL (VITAMIN D3) 50 MCG
1 TABLET ORAL DAILY
Qty: 30 TABLET | Refills: 3 | Status: SHIPPED | OUTPATIENT
Start: 2022-05-17 | End: 2022-06-20

## 2022-10-25 ENCOUNTER — OFFICE VISIT (OUTPATIENT)
Dept: FAMILY MEDICINE | Facility: CLINIC | Age: 62
End: 2022-10-25
Payer: COMMERCIAL

## 2022-10-25 DIAGNOSIS — B00.1 RECURRENT HERPES LABIALIS: Primary | ICD-10-CM

## 2022-10-25 DIAGNOSIS — K64.4 EXTERNAL HEMORRHOIDS: ICD-10-CM

## 2022-10-25 DIAGNOSIS — Z20.822 SUSPECTED 2019 NOVEL CORONAVIRUS INFECTION: ICD-10-CM

## 2022-10-25 DIAGNOSIS — J01.10 ACUTE NON-RECURRENT FRONTAL SINUSITIS: ICD-10-CM

## 2022-10-25 PROCEDURE — 99214 OFFICE O/P EST MOD 30 MIN: CPT | Performed by: FAMILY MEDICINE

## 2022-10-25 RX ORDER — ACETAMINOPHEN 500 MG
500-1000 TABLET ORAL EVERY 6 HOURS PRN
Qty: 100 TABLET | Refills: 1 | Status: SHIPPED | OUTPATIENT
Start: 2022-10-25 | End: 2023-10-12

## 2022-10-25 RX ORDER — GUAIFENESIN/DEXTROMETHORPHAN 100-10MG/5
10 SYRUP ORAL EVERY 4 HOURS PRN
Qty: 354 ML | Refills: 1 | Status: SHIPPED | OUTPATIENT
Start: 2022-10-25 | End: 2024-03-27

## 2022-10-25 RX ORDER — HYDROCORTISONE ACETATE 25 MG/1
25 SUPPOSITORY RECTAL 2 TIMES DAILY PRN
Qty: 28 SUPPOSITORY | Refills: 11 | Status: SHIPPED | OUTPATIENT
Start: 2022-10-25

## 2022-10-25 RX ORDER — AMOXICILLIN 875 MG
875 TABLET ORAL 2 TIMES DAILY
Qty: 14 TABLET | Refills: 0 | Status: SHIPPED | OUTPATIENT
Start: 2022-10-25 | End: 2024-03-27

## 2022-10-25 RX ORDER — ACETAMINOPHEN 325 MG/1
325 TABLET ORAL
COMMUNITY
Start: 2022-10-08

## 2022-10-25 RX ORDER — ACYCLOVIR 400 MG/1
400 TABLET ORAL EVERY 8 HOURS
Qty: 15 TABLET | Refills: 0 | Status: SHIPPED | OUTPATIENT
Start: 2022-10-25 | End: 2022-10-30

## 2022-10-25 NOTE — PROGRESS NOTES
Assessment & Plan     Acute non-recurrent frontal sinusitis  Recent positive COVID-19 test  Cough  Patient has a 10+ day history of productive cough and sinus pressure following a recent COVID-19 infection. Likely a secondary bacterial infection. Lungs are clear to auscultation bilaterally. Pain is localized to bilateral frontal sinuses raising suspicion for sinusitis. Due to location and duration of symptoms, recommend amoxicillin for bacterial sinusitis. Also providing refill of robitussin and coated tylenol.  - amoxicillin (AMOXIL) 875 MG tablet  Dispense: 14 tablet; Refill: 0  - guaiFENesin-dextromethorphan (ROBITUSSIN DM) 100-10 MG/5ML syrup  Dispense: 354 mL; Refill: 1  - acetaminophen (TYLENOL) 500 MG tablet  Dispense: 100 tablet; Refill: 1  - Return in about 30 days for COVID and flu vaccinations    Recurrent herpes labialis  Patient has history of cold sores and currently has a cold sore on her L upper lip. Patient reports pain and pruritis. Recommend treatment with acyclovir.  - acyclovir (ZOVIRAX) 400 MG tablet  Dispense: 15 tablet; Refill: 0    External hemorrhoids  Refill  - hydrocortisone (ANUSOL-HC) 25 MG suppository  Dispense: 28 suppository; Refill: 11      Assessment requiring an independent historian(s) - family - sister  Prescription drug management             Return in about 4 weeks (around 11/22/2022) for covid vaccine.    Suzi Berry, MS3  I was present with the medical student who participated in the service and in the documentation of this note. I have verified the history and personally performed the physical exam and medical decision making, and have verified the content of the note, which accurately reflects my assessment of the patient and the plan of care.   Sudarshan Bowling MD         [facility] SUBHA Angeles is a 62 year old accompanied by her sister, presenting for the following health issues:  Cough (Pt reports she had covid on 10/ 08/2022 since that  she having cough with yellow mucus. Pt also states abscess on upper lip.) and Refill Request (fluticasone (FLONASE) 50 MCG/ACT nasal spray, traZODone (DESYREL) 50 MG tablet, hydrocortisone, Perianal, (ANUSOL-HC) 2.5 % creamPt request (TYLENOL) 600  MG tablet)      HPI   Patient previously presented to [facility] urgent care on 10/8/22 and was found to be COVID positive. Started to feel better about a week after diagnosis, but lost her sense of smell a few days ago. Continues to have a cough (since about 10 days ago). Says cough has been worsening and wakes her up at night. Producing thick yellow mucus for about 7 days. Requests medication to help with mucus. Requests COVID and flu vaccines, but discussed benefit of waiting until no longer acutely ill.    Has a cold sore on upper lip on the left side which she reports has been itchy. Also has an aphthous ulcer inside her mouth on her L cheek. Says that the pain has been making it hard to ear.            Review of Systems   7 point review of systems negative apart from symptoms listed above and chronic constipation.       Objective    There were no vitals taken for this visit.  There is no height or weight on file to calculate BMI.  Physical Exam  Constitutional:       General: She is not in acute distress.     Appearance: Normal appearance.   HENT:      Head: Normocephalic.        Comments: Scabbed (cold sore appearing) lesion on left side of upper lip  Aphthous ulcer on interior side of left cheek     Nose: No rhinorrhea.      Right Sinus: Frontal sinus tenderness present. No maxillary sinus tenderness.      Left Sinus: Frontal sinus tenderness present. No maxillary sinus tenderness.      Comments: Erythema of R nostril  Cardiovascular:      Rate and Rhythm: Normal rate and regular rhythm.      Heart sounds: Murmur heard.   Pulmonary:      Effort: Pulmonary effort is normal. No respiratory distress.      Breath sounds: Normal breath sounds. No wheezing, rhonchi or  rales.   Neurological:      Mental Status: She is alert.

## 2022-10-25 NOTE — PATIENT INSTRUCTIONS
Patient Education   Here is the plan from today's visit    1. Recurrent herpes labialis  Cold sore: Take 1 tablet 3 times a day (every 8 hours) for 5 days. Make sure to finish the whole course.  - acyclovir (ZOVIRAX) 400 MG tablet; Take 1 tablet (400 mg) by mouth every 8 hours for 5 days  Dispense: 15 tablet; Refill: 0    2. Acute non-recurrent frontal sinusitis  Sinus infection: Take 1 tablet by mouth twice a day (one in the morning, one in the evening) for 7 days. Be sure to finish taking all of the medication even if you start to feel better.  - amoxicillin (AMOXIL) 875 MG tablet; Take 1 tablet (875 mg) by mouth 2 times daily  Dispense: 14 tablet; Refill: 0    3. Suspected 2019 novel coronavirus infection  For cough: take 10 mLs by mouth every 4 hours as needed.  - guaiFENesin-dextromethorphan (ROBITUSSIN DM) 100-10 MG/5ML syrup; Take 10 mLs by mouth every 4 hours as needed for cough  Dispense: 354 mL; Refill: 1    4. External hemorrhoids  Refill  - hydrocortisone (ANUSOL-HC) 25 MG suppository; Place 1 suppository (25 mg) rectally 2 times daily as needed for hemorrhoids  Dispense: 28 suppository; Refill: 11         Please call or return to clinic if your symptoms don't go away.    Follow up plan  Return in about 4 weeks (around 11/22/2022) for covid vaccine.    Thank you for coming to [facility] today.  Lab Testing:  **If you had lab testing today and your results are reassuring or normal they will be mailed to you or sent through olook within 7 days.   **If the lab tests need quick action we will call you with the results.  **If you are having labs done on a different day, please call [phone] to schedule at [facility] or [phone] for other [facility] Outpatient Lab locations. Labs do not offer walk-in appointments.  The phone number we will call with results is # [phone] (home) [phone] (work). If this is not the best number please call our clinic and change the  number.  Medication Refills:  If you need any refills please call your pharmacy and they will contact us.   If you need to  your refill at a new pharmacy, please contact the new pharmacy directly. The new pharmacy will help you get your medications transferred faster.   Scheduling:  If you have any concerns about today's visit or wish to schedule another appointment please call our office during normal business hours [phone] (8-5:00 M-F)  If a referral was made to an [facility] specialty provider and you do not get a call from central scheduling, please refer to directions on your visit summary or call our office during normal business hours for assistance.   If a Mammogram was ordered for you at the Breast Center call [phone] to schedule or change your appointment.  If you had an XRay/CT/Ultrasound/MRI ordered the number is [phone] to schedule or change your radiology appointment.   [facility] has limited ultrasound appointments available on Wednesdays, if you would like your ultrasound at [facility], please call [phone] to schedule.   Medical Concerns:  If you have urgent medical concerns please call [phone] at any time of the day.    Sudarshan Bowling MD

## 2022-10-31 DIAGNOSIS — K64.4 EXTERNAL HEMORRHOIDS: ICD-10-CM

## 2022-11-07 RX ORDER — HYDROCORTISONE 25 MG/G
CREAM TOPICAL
Qty: 30 G | Refills: 0 | OUTPATIENT
Start: 2022-11-07

## 2023-04-02 NOTE — TELEPHONE ENCOUNTER
Prior Authorization Approval    Authorization Effective Date: 11/3/2019  Authorization Expiration Date: 12/3/2020  Medication: PA for benzonatate 100 mg - Approved   Approved Dose/Quantity:   Reference #: [id number]   Insurance Company: [employer] - Phone [phone] Fax [phone]  Expected CoPay:       CoPay Card Available: No    Foundation Assistance Needed:    Which Pharmacy is filling the prescription (Not needed for infusion/clinic administered): [facility] [place], [place] - [address]   Pharmacy Notified: Yes  Patient Notified: Yes         unable to obtain due to cognitive impairment

## 2023-10-12 ENCOUNTER — OFFICE VISIT (OUTPATIENT)
Dept: FAMILY MEDICINE | Facility: CLINIC | Age: 63
End: 2023-10-12
Payer: COMMERCIAL

## 2023-10-12 VITALS
OXYGEN SATURATION: 98 % | SYSTOLIC BLOOD PRESSURE: 120 MMHG | HEART RATE: 71 BPM | RESPIRATION RATE: 16 BRPM | DIASTOLIC BLOOD PRESSURE: 73 MMHG

## 2023-10-12 DIAGNOSIS — Z00.00 ROUTINE GENERAL MEDICAL EXAMINATION AT A HEALTH CARE FACILITY: Primary | ICD-10-CM

## 2023-10-12 DIAGNOSIS — H04.123 DRY EYES: ICD-10-CM

## 2023-10-12 DIAGNOSIS — G47.00 INSOMNIA, UNSPECIFIED TYPE: ICD-10-CM

## 2023-10-12 LAB
ALBUMIN SERPL BCG-MCNC: 4.3 G/DL (ref 3.5–5.2)
ALP SERPL-CCNC: 66 U/L (ref 35–104)
ALT SERPL W P-5'-P-CCNC: 11 U/L (ref 0–50)
ANION GAP SERPL CALCULATED.3IONS-SCNC: 11 MMOL/L (ref 7–15)
AST SERPL W P-5'-P-CCNC: 25 U/L (ref 0–45)
BILIRUB SERPL-MCNC: 0.5 MG/DL
BUN SERPL-MCNC: 7.7 MG/DL (ref 8–23)
CALCIUM SERPL-MCNC: 9.3 MG/DL (ref 8.8–10.2)
CHLORIDE SERPL-SCNC: 101 MMOL/L (ref 98–107)
CHOLEST SERPL-MCNC: 166 MG/DL
CREAT SERPL-MCNC: 0.56 MG/DL (ref 0.51–0.95)
DEPRECATED HCO3 PLAS-SCNC: 24 MMOL/L (ref 22–29)
EGFRCR SERPLBLD CKD-EPI 2021: >90 ML/MIN/1.73M2
ERYTHROCYTE [DISTWIDTH] IN BLOOD BY AUTOMATED COUNT: 11.6 % (ref 10–15)
GLUCOSE SERPL-MCNC: 91 MG/DL (ref 70–99)
HCT VFR BLD AUTO: 35.8 % (ref 35–47)
HDLC SERPL-MCNC: 64 MG/DL
HGB BLD-MCNC: 11.9 G/DL (ref 11.7–15.7)
LDLC SERPL CALC-MCNC: 84 MG/DL
MCH RBC QN AUTO: 31 PG (ref 26.5–33)
MCHC RBC AUTO-ENTMCNC: 33.2 G/DL (ref 31.5–36.5)
MCV RBC AUTO: 93 FL (ref 78–100)
NONHDLC SERPL-MCNC: 102 MG/DL
PLATELET # BLD AUTO: 213 10E3/UL (ref 150–450)
POTASSIUM SERPL-SCNC: 4 MMOL/L (ref 3.4–5.3)
PROT SERPL-MCNC: 7.2 G/DL (ref 6.4–8.3)
RBC # BLD AUTO: 3.84 10E6/UL (ref 3.8–5.2)
SODIUM SERPL-SCNC: 136 MMOL/L (ref 135–145)
TRIGL SERPL-MCNC: 88 MG/DL
TSH SERPL DL<=0.005 MIU/L-ACNC: 2.09 UIU/ML (ref 0.3–4.2)
WBC # BLD AUTO: 4.5 10E3/UL (ref 4–11)

## 2023-10-12 PROCEDURE — 80053 COMPREHEN METABOLIC PANEL: CPT | Performed by: STUDENT IN AN ORGANIZED HEALTH CARE EDUCATION/TRAINING PROGRAM

## 2023-10-12 PROCEDURE — 99396 PREV VISIT EST AGE 40-64: CPT | Mod: GC | Performed by: STUDENT IN AN ORGANIZED HEALTH CARE EDUCATION/TRAINING PROGRAM

## 2023-10-12 PROCEDURE — 80061 LIPID PANEL: CPT | Performed by: STUDENT IN AN ORGANIZED HEALTH CARE EDUCATION/TRAINING PROGRAM

## 2023-10-12 PROCEDURE — 85027 COMPLETE CBC AUTOMATED: CPT | Performed by: STUDENT IN AN ORGANIZED HEALTH CARE EDUCATION/TRAINING PROGRAM

## 2023-10-12 PROCEDURE — 82274 ASSAY TEST FOR BLOOD FECAL: CPT | Performed by: STUDENT IN AN ORGANIZED HEALTH CARE EDUCATION/TRAINING PROGRAM

## 2023-10-12 PROCEDURE — 84443 ASSAY THYROID STIM HORMONE: CPT | Performed by: STUDENT IN AN ORGANIZED HEALTH CARE EDUCATION/TRAINING PROGRAM

## 2023-10-12 PROCEDURE — 36415 COLL VENOUS BLD VENIPUNCTURE: CPT | Performed by: STUDENT IN AN ORGANIZED HEALTH CARE EDUCATION/TRAINING PROGRAM

## 2023-10-12 RX ORDER — GUAIFENESIN/DEXTROMETHORPHAN 100-10MG/5
10 SYRUP ORAL EVERY 4 HOURS PRN
Qty: 354 ML | Refills: 1 | Status: CANCELLED | OUTPATIENT
Start: 2023-10-12

## 2023-10-12 RX ORDER — CARBOXYMETHYLCELLULOSE SODIUM 5 MG/ML
1 SOLUTION/ DROPS OPHTHALMIC DAILY PRN
Qty: 30 ML | Refills: 1 | Status: SHIPPED | OUTPATIENT
Start: 2023-10-12 | End: 2024-02-20

## 2023-10-12 RX ORDER — ACETAMINOPHEN 500 MG
500-1000 TABLET ORAL EVERY 6 HOURS PRN
Qty: 100 TABLET | Refills: 1 | Status: SHIPPED | OUTPATIENT
Start: 2023-10-12

## 2023-10-12 RX ORDER — TRAZODONE HYDROCHLORIDE 50 MG/1
50 TABLET, FILM COATED ORAL
Qty: 30 TABLET | Refills: 11 | Status: SHIPPED | OUTPATIENT
Start: 2023-10-12

## 2023-10-12 RX ORDER — ACETAMINOPHEN 325 MG/1
325 TABLET ORAL
Status: CANCELLED | OUTPATIENT
Start: 2023-10-12

## 2023-10-12 RX ORDER — POLYETHYLENE GLYCOL 400 AND PROPYLENE GLYCOL 4; 3 MG/ML; MG/ML
SOLUTION/ DROPS OPHTHALMIC
COMMUNITY
Start: 2022-12-16

## 2023-10-12 RX ORDER — POLYETHYLENE GLYCOL 3350 17 G/17G
1 POWDER, FOR SOLUTION ORAL DAILY
Qty: 578 G | Refills: 1 | Status: SHIPPED | OUTPATIENT
Start: 2023-10-12 | End: 2024-08-20

## 2023-10-12 ASSESSMENT — ENCOUNTER SYMPTOMS
NERVOUS/ANXIOUS: 0
CONSTIPATION: 1
NAUSEA: 0
SORE THROAT: 0
DIZZINESS: 0
BREAST MASS: 0
EYE PAIN: 0
HEMATURIA: 0
CHILLS: 0
HEADACHES: 1
PALPITATIONS: 0
MYALGIAS: 0
JOINT SWELLING: 0
DYSURIA: 0
PARESTHESIAS: 0
COUGH: 1
FREQUENCY: 0
HEARTBURN: 0
WEAKNESS: 0
SHORTNESS OF BREATH: 0
HEMATOCHEZIA: 0
ABDOMINAL PAIN: 1
DIARRHEA: 0
FEVER: 0
ARTHRALGIAS: 1

## 2023-10-12 NOTE — LETTER
October 16, 2023      Bridgette Carl  [address]  [address]  [facility] [address]        Dear ,    We are writing to inform you of your test results.    Your test results fall within the expected range(s) or remain unchanged from previous results.  Please continue with current treatment plan.    Resulted Orders   Fecal colorectal cancer screen FITT   Result Value Ref Range    Occult Blood Screen FIT Negative Negative   CBC with platelets   Result Value Ref Range    WBC Count 4.5 4.0 - 11.0 10e3/uL    RBC Count 3.84 3.80 - 5.20 10e6/uL    Hemoglobin 11.9 11.7 - 15.7 g/dL    Hematocrit 35.8 35.0 - 47.0 %    MCV 93 78 - 100 fL    MCH 31.0 26.5 - 33.0 pg    MCHC 33.2 31.5 - 36.5 g/dL    RDW 11.6 10.0 - 15.0 %    Platelet Count 213 150 - 450 10e3/uL   Lipid panel   Result Value Ref Range    Cholesterol 166 <200 mg/dL    Triglycerides 88 <150 mg/dL    Direct Measure HDL 64 >=50 mg/dL    LDL Cholesterol Calculated 84 <=100 mg/dL    Non HDL Cholesterol 102 <130 mg/dL    Narrative    Cholesterol  Desirable:  <200 mg/dL    Triglycerides  Normal:  Less than 150 mg/dL  Borderline High:  150-199 mg/dL  High:  200-499 mg/dL  Very High:  Greater than or equal to 500 mg/dL    Direct Measure HDL  Female:  Greater than or equal to 50 mg/dL   Male:  Greater than or equal to 40 mg/dL    LDL Cholesterol  Desirable:  <100mg/dL  Above Desirable:  100-129 mg/dL   Borderline High:  130-159 mg/dL   High:  160-189 mg/dL   Very High:  >= 190 mg/dL    Non HDL Cholesterol  Desirable:  130 mg/dL  Above Desirable:  130-159 mg/dL  Borderline High:  160-189 mg/dL  High:  190-219 mg/dL  Very High:  Greater than or equal to 220 mg/dL   TSH with free T4 reflex   Result Value Ref Range    TSH 2.09 0.30 - 4.20 uIU/mL   Comprehensive metabolic panel   Result Value Ref Range    Sodium 136 135 - 145 mmol/L      Comment:      Reference intervals for this test were updated on 09/26/2023 to more accurately reflect our healthy population. There may be  differences in the flagging of prior results with similar values performed with this method. Interpretation of those prior results can be made in the context of the updated reference intervals.     Potassium 4.0 3.4 - 5.3 mmol/L    Carbon Dioxide (CO2) 24 22 - 29 mmol/L    Anion Gap 11 7 - 15 mmol/L    Urea Nitrogen 7.7 (L) 8.0 - 23.0 mg/dL    Creatinine 0.56 0.51 - 0.95 mg/dL    GFR Estimate >90 >60 mL/min/1.73m2    Calcium 9.3 8.8 - 10.2 mg/dL    Chloride 101 98 - 107 mmol/L    Glucose 91 70 - 99 mg/dL    Alkaline Phosphatase 66 35 - 104 U/L    AST 25 0 - 45 U/L      Comment:      Reference intervals for this test were updated on 6/12/2023 to more accurately reflect our healthy population. There may be differences in the flagging of prior results with similar values performed with this method. Interpretation of those prior results can be made in the context of the updated reference intervals.    ALT 11 0 - 50 U/L      Comment:      Reference intervals for this test were updated on 6/12/2023 to more accurately reflect our healthy population. There may be differences in the flagging of prior results with similar values performed with this method. Interpretation of those prior results can be made in the context of the updated reference intervals.      Protein Total 7.2 6.4 - 8.3 g/dL    Albumin 4.3 3.5 - 5.2 g/dL    Bilirubin Total 0.5 <=1.2 mg/dL       If you have any questions or concerns, please call the clinic at the number listed above.       Sincerely,      Jayne Castañeda MD

## 2023-10-12 NOTE — PROGRESS NOTES
SUBJECTIVE:   CC: Bridgette is an 63 year old who presents for preventive health visit.     Healthy Habits:     Getting at least 3 servings of Calcium per day:  Yes    Bi-annual eye exam:  Yes    Dental care twice a year:  NO    Sleep apnea or symptoms of sleep apnea:  None    Diet:  Regular (no restrictions)    Frequency of exercise:  4-5 days/week    Duration of exercise:  30-45 minutes    Taking medications regularly:  Yes    Medication side effects:  None    Additional concerns today:  No      Mild headaches intermittently for the past week, does wear glasses but not consistently - last eye visit was 2 years ago. Also difficulty sleeping may be contributing - has not been taking trazodone for the past weeks.     Have you ever done Advance Care Planning? (For example, a Health Directive, POLST, or a discussion with a medical provider or your loved ones about your wishes): No, advance care planning information given to patient to review.  Patient declined advance care planning discussion at this time.    Social History     Tobacco Use    Smoking status: Never    Smokeless tobacco: Never   Substance Use Topics    Alcohol use: No             10/12/2023    10:49 AM   Alcohol Use   Prescreen: >3 drinks/day or >7 drinks/week? No     Reviewed orders with patient.  Reviewed health maintenance and updated orders accordingly - Yes  Lab work is in process    Breast Cancer Screening:  Any new diagnosis of family breast, ovarian, or bowel cancer? No    FHS-7:        No data to display                Mammogram Screening - Mammography discussed and declined  Pertinent mammograms are reviewed under the imaging tab.    History of abnormal Pap smear: NO - age 30-65 PAP every 5 years with negative HPV co-testing recommended - patient declined     Reviewed and updated as needed this visit by clinical staff   Tobacco  Allergies  Meds   Med Hx  Surg Hx  Fam Hx          Reviewed and updated as needed this visit by Provider        Med Hx  Surg Hx  Fam Hx             Review of Systems   Constitutional:  Negative for chills and fever.   HENT:  Positive for hearing loss. Negative for congestion, ear pain and sore throat.    Eyes:  Positive for visual disturbance. Negative for pain.   Respiratory:  Positive for cough. Negative for shortness of breath.    Cardiovascular:  Negative for chest pain, palpitations and peripheral edema.   Gastrointestinal:  Positive for abdominal pain and constipation. Negative for diarrhea, heartburn, hematochezia and nausea.   Breasts:  Negative for tenderness, breast mass and discharge.   Genitourinary:  Negative for dysuria, frequency, genital sores, hematuria, pelvic pain, urgency, vaginal bleeding and vaginal discharge.   Musculoskeletal:  Positive for arthralgias. Negative for joint swelling and myalgias.   Skin:  Negative for rash.   Neurological:  Positive for headaches. Negative for dizziness, weakness and paresthesias.   Psychiatric/Behavioral:  Negative for mood changes. The patient is not nervous/anxious.       ROS: 10 point ROS neg other than the symptoms noted above in the HPI.      OBJECTIVE:   /73 (BP Location: Left arm, Patient Position: Sitting, Cuff Size: Adult Small)   Pulse 71   Resp 16   SpO2 98%   Physical Exam  Vitals reviewed.   Constitutional:       Appearance: Normal appearance.   Eyes:      Conjunctiva/sclera: Conjunctivae normal.   Cardiovascular:      Rate and Rhythm: Normal rate and regular rhythm.      Heart sounds: Normal heart sounds.   Pulmonary:      Effort: Pulmonary effort is normal.      Breath sounds: Normal breath sounds.   Musculoskeletal:         General: No swelling.   Skin:     General: Skin is warm and dry.   Neurological:      Mental Status: She is alert.   Psychiatric:         Mood and Affect: Mood normal.         Behavior: Behavior normal.                 ASSESSMENT/PLAN:       ICD-10-CM    1. Routine general medical examination at a health care facility   Z00.00 polyethylene glycol (MIRALAX) 17 GM/Dose powder     Fecal colorectal cancer screen FITT     CBC with platelets     Lipid panel     TSH with free T4 reflex     Comprehensive metabolic panel     DX Hip/Pelvis/Spine     Fecal colorectal cancer screen FITT     CBC with platelets     Lipid panel     TSH with free T4 reflex     Comprehensive metabolic panel      2. Dry eyes  H04.123 carboxymethylcellulose (REFRESH PLUS) 0.5 % SOLN ophthalmic solution      3. Insomnia, unspecified type  G47.00 traZODone (DESYREL) 50 MG tablet          Patient has been advised of split billing requirements and indicates understanding: Yes      COUNSELING:  Reviewed preventive health counseling, as reflected in patient instructions        She reports that she has never smoked. She has never used smokeless tobacco.      Jayne Castañeda MD  [facility]

## 2023-10-12 NOTE — LETTER
October 13, 2023      Bridgette Carl  [address]  [address]  [facility] [address]        Dear ,    We are writing to inform you of your test results.    Your test results fall within the expected range(s) or remain unchanged from previous results.  Please continue with current treatment plan.    Resulted Orders   CBC with platelets   Result Value Ref Range    WBC Count 4.5 4.0 - 11.0 10e3/uL    RBC Count 3.84 3.80 - 5.20 10e6/uL    Hemoglobin 11.9 11.7 - 15.7 g/dL    Hematocrit 35.8 35.0 - 47.0 %    MCV 93 78 - 100 fL    MCH 31.0 26.5 - 33.0 pg    MCHC 33.2 31.5 - 36.5 g/dL    RDW 11.6 10.0 - 15.0 %    Platelet Count 213 150 - 450 10e3/uL   Lipid panel   Result Value Ref Range    Cholesterol 166 <200 mg/dL    Triglycerides 88 <150 mg/dL    Direct Measure HDL 64 >=50 mg/dL    LDL Cholesterol Calculated 84 <=100 mg/dL    Non HDL Cholesterol 102 <130 mg/dL    Narrative    Cholesterol  Desirable:  <200 mg/dL    Triglycerides  Normal:  Less than 150 mg/dL  Borderline High:  150-199 mg/dL  High:  200-499 mg/dL  Very High:  Greater than or equal to 500 mg/dL    Direct Measure HDL  Female:  Greater than or equal to 50 mg/dL   Male:  Greater than or equal to 40 mg/dL    LDL Cholesterol  Desirable:  <100mg/dL  Above Desirable:  100-129 mg/dL   Borderline High:  130-159 mg/dL   High:  160-189 mg/dL   Very High:  >= 190 mg/dL    Non HDL Cholesterol  Desirable:  130 mg/dL  Above Desirable:  130-159 mg/dL  Borderline High:  160-189 mg/dL  High:  190-219 mg/dL  Very High:  Greater than or equal to 220 mg/dL   TSH with free T4 reflex   Result Value Ref Range    TSH 2.09 0.30 - 4.20 uIU/mL   Comprehensive metabolic panel   Result Value Ref Range    Sodium 136 135 - 145 mmol/L      Comment:      Reference intervals for this test were updated on 09/26/2023 to more accurately reflect our healthy population. There may be differences in the flagging of prior results with similar values performed with this method. Interpretation  of those prior results can be made in the context of the updated reference intervals.     Potassium 4.0 3.4 - 5.3 mmol/L    Carbon Dioxide (CO2) 24 22 - 29 mmol/L    Anion Gap 11 7 - 15 mmol/L    Urea Nitrogen 7.7 (L) 8.0 - 23.0 mg/dL    Creatinine 0.56 0.51 - 0.95 mg/dL    GFR Estimate >90 >60 mL/min/1.73m2    Calcium 9.3 8.8 - 10.2 mg/dL    Chloride 101 98 - 107 mmol/L    Glucose 91 70 - 99 mg/dL    Alkaline Phosphatase 66 35 - 104 U/L    AST 25 0 - 45 U/L      Comment:      Reference intervals for this test were updated on 6/12/2023 to more accurately reflect our healthy population. There may be differences in the flagging of prior results with similar values performed with this method. Interpretation of those prior results can be made in the context of the updated reference intervals.    ALT 11 0 - 50 U/L      Comment:      Reference intervals for this test were updated on 6/12/2023 to more accurately reflect our healthy population. There may be differences in the flagging of prior results with similar values performed with this method. Interpretation of those prior results can be made in the context of the updated reference intervals.      Protein Total 7.2 6.4 - 8.3 g/dL    Albumin 4.3 3.5 - 5.2 g/dL    Bilirubin Total 0.5 <=1.2 mg/dL       If you have any questions or concerns, please call the clinic at the number listed above.       Sincerely,      Jayne Castañeda MD

## 2023-10-15 ASSESSMENT — ENCOUNTER SYMPTOMS
NERVOUS/ANXIOUS: 0
SHORTNESS OF BREATH: 0
HEMATOCHEZIA: 0
SORE THROAT: 0
BREAST MASS: 0
ARTHRALGIAS: 1
DYSURIA: 0
ABDOMINAL PAIN: 1
HEMATURIA: 0
DIZZINESS: 0
CONSTIPATION: 1
DIARRHEA: 0
FREQUENCY: 0
JOINT SWELLING: 0
NAUSEA: 0
FEVER: 0
COUGH: 1
HEADACHES: 1
HEARTBURN: 0
MYALGIAS: 0
EYE PAIN: 0
CHILLS: 0
WEAKNESS: 0
PALPITATIONS: 0
PARESTHESIAS: 0

## 2023-10-16 LAB — HEMOCCULT STL QL IA: NEGATIVE

## 2024-02-09 ENCOUNTER — DOCUMENTATION ONLY (OUTPATIENT)
Dept: FAMILY MEDICINE | Facility: CLINIC | Age: 64
End: 2024-02-09
Payer: COMMERCIAL

## 2024-02-09 NOTE — PROGRESS NOTES
"When opening a documentation only encounter, be sure to enter in \"Chief Complaint\" Forms and in \" Comments\" Title of form, description if needed.    Bridgette is a 63 year old  female  Form received via: Fax  Form now resides in: Provider Ready    Harika Duffy MA    Form has been completed by provider.     Form sent out via: Fax to [employer] at Fax Number: [phone]  Patient informed:   Output date: February 20, 2024    Harika Duffy MA      **Please close the encounter**              "

## 2024-02-19 DIAGNOSIS — H04.123 DRY EYES: ICD-10-CM

## 2024-02-20 RX ORDER — CARBOXYMETHYLCELLULOSE SODIUM 5 MG/ML
1 SOLUTION/ DROPS OPHTHALMIC DAILY PRN
Qty: 30 EACH | Refills: 1 | Status: SHIPPED | OUTPATIENT
Start: 2024-02-20 | End: 2024-06-02

## 2024-03-27 ENCOUNTER — OFFICE VISIT (OUTPATIENT)
Dept: FAMILY MEDICINE | Facility: CLINIC | Age: 64
End: 2024-03-27
Payer: COMMERCIAL

## 2024-03-27 VITALS
HEART RATE: 89 BPM | OXYGEN SATURATION: 98 % | HEIGHT: 61 IN | TEMPERATURE: 98.6 F | SYSTOLIC BLOOD PRESSURE: 97 MMHG | BODY MASS INDEX: 16.67 KG/M2 | DIASTOLIC BLOOD PRESSURE: 63 MMHG

## 2024-03-27 DIAGNOSIS — Z20.822 SUSPECTED 2019 NOVEL CORONAVIRUS INFECTION: ICD-10-CM

## 2024-03-27 DIAGNOSIS — K64.4 EXTERNAL HEMORRHOIDS: ICD-10-CM

## 2024-03-27 DIAGNOSIS — R09.81 NASAL CONGESTION: ICD-10-CM

## 2024-03-27 DIAGNOSIS — R05.1 ACUTE COUGH: Primary | ICD-10-CM

## 2024-03-27 LAB
FLUAV AG SPEC QL IA: NEGATIVE
FLUAV RNA SPEC QL NAA+PROBE: NEGATIVE
FLUBV AG SPEC QL IA: NEGATIVE
FLUBV RNA RESP QL NAA+PROBE: NEGATIVE
RSV RNA SPEC NAA+PROBE: NEGATIVE
SARS-COV-2 RNA RESP QL NAA+PROBE: NEGATIVE

## 2024-03-27 PROCEDURE — 87637 SARSCOV2&INF A&B&RSV AMP PRB: CPT

## 2024-03-27 PROCEDURE — 99213 OFFICE O/P EST LOW 20 MIN: CPT | Mod: GC

## 2024-03-27 RX ORDER — BENZONATATE 100 MG/1
100-200 CAPSULE ORAL 3 TIMES DAILY PRN
Qty: 30 CAPSULE | Refills: 3 | Status: SHIPPED | OUTPATIENT
Start: 2024-03-27

## 2024-03-27 RX ORDER — HYDROCORTISONE 25 MG/G
CREAM TOPICAL
Qty: 30 G | Refills: 3 | Status: SHIPPED | OUTPATIENT
Start: 2024-03-27

## 2024-03-27 RX ORDER — ECHINACEA PURPUREA EXTRACT 125 MG
TABLET ORAL
Qty: 30 ML | Refills: 0 | Status: SHIPPED | OUTPATIENT
Start: 2024-03-27

## 2024-03-27 RX ORDER — GUAIFENESIN/DEXTROMETHORPHAN 100-10MG/5
10 SYRUP ORAL EVERY 4 HOURS PRN
Qty: 354 ML | Refills: 1 | Status: SHIPPED | OUTPATIENT
Start: 2024-03-27

## 2024-03-27 NOTE — PATIENT INSTRUCTIONS
Patient Education   Here is the plan from today's visit    1. Suspected 2019 novel coronavirus infection    - guaiFENesin-dextromethorphan (ROBITUSSIN DM) 100-10 MG/5ML syrup; Take 10 mLs by mouth every 4 hours as needed for cough  Dispense: 354 mL; Refill: 1  - benzonatate (TESSALON) 100 MG capsule; Take 1-2 capsules (100-200 mg) by mouth 3 times daily as needed for cough  Dispense: 30 capsule; Refill: 3  - sodium chloride (OCEAN) 0.65 % nasal spray; Spray 2 sprays into each nostril every hour as needed for congestion or dryness  Dispense: 30 mL; Refill: 0  - Symptomatic Influenza A/B, RSV, & SARS-CoV2 PCR (COVID-19) Nose    2. External hemorrhoids    - hydrocortisone, Perianal, (ANUSOL-HC) 2.5 % cream; APPLY A SMALL AMOUNT RECTALLY 2 TIMES DAILY AS NEEDED FOR HEMORRHOIDS  Dispense: 30 g; Refill: 3    3. Acute cough    - benzonatate (TESSALON) 100 MG capsule; Take 1-2 capsules (100-200 mg) by mouth 3 times daily as needed for cough  Dispense: 30 capsule; Refill: 3  - Influenza A/B antigen  - Symptomatic Influenza A/B, RSV, & SARS-CoV2 PCR (COVID-19) Nose    4. Nasal congestion    - sodium chloride (OCEAN) 0.65 % nasal spray; Spray 2 sprays into each nostril every hour as needed for congestion or dryness  Dispense: 30 mL; Refill: 0  - Influenza A/B antigen  - Symptomatic Influenza A/B, RSV, & SARS-CoV2 PCR (COVID-19) Nose          Please call or return to clinic if your symptoms don't go away.    Follow up plan  Return if symptoms worsen or fail to improve.    Thank you for coming to [facility] today.  Lab Testing:  **If you had lab testing today and your results are reassuring or normal they will be mailed to you or sent through [employer] within 7 days.   **If the lab tests need quick action we will call you with the results.  **If you are having labs done on a different day, please call [phone] to schedule at [facility] or [phone] for other [facility] locations. Labs do not  offer walk-in appointments.  The phone number we will call with results is # [phone] (home) [phone] (work). If this is not the best number please call our clinic and change the number.  Medication Refills:  If you need any refills please call your pharmacy and they will contact us.   If you need to  your refill at a new pharmacy, please contact the new pharmacy directly. The new pharmacy will help you get your medications transferred faster.   Scheduling:  If you have any concerns about today's visit or wish to schedule another appointment please call our office during normal business hours [phone] (8-5:00 M-F). If you can no longer make a scheduled visit, please cancel via CRMnext or call us to cancel.   If a referral was made to an [facility] specialty provider and you do not get a call from central scheduling, please refer to directions on your visit summary or call our office during normal business hours for assistance.   If a Mammogram was ordered for you at the [facility] call [phone] to schedule or change your appointment.  If you had an XRay/CT/Ultrasound/MRI ordered the number is [phone] to schedule or change your radiology appointment.   [facility] has limited ultrasound appointments available on Wednesdays, if you would like your ultrasound at [facility], please call [phone] to schedule.   Medical Concerns:  If you have urgent medical concerns please call [phone] at any time of the day.    Jacque Wilkinson MD

## 2024-03-27 NOTE — PROGRESS NOTES
"  Assessment & Plan     Suspected 2019 novel coronavirus infection  Acute cough  Nasal congestion  1 week nasal congestion, dry cough, sore throat without PE findings of strep consistent with viral URI. Symptomatic management, return if no improvement by 1 week from now. No fever, normal lung exam and so no indication for bacterial PNA workup at this time. Triple swab to guide isolation, wouldn't be in window for tamiflu and not high risk for hospitalization for covid if positive.   - guaiFENesin-dextromethorphan (ROBITUSSIN DM) 100-10 MG/5ML syrup  Dispense: 354 mL; Refill: 1  - benzonatate (TESSALON) 100 MG capsule  Dispense: 30 capsule; Refill: 3  - sodium chloride (OCEAN) 0.65 % nasal spray  Dispense: 30 mL; Refill: 0  - Symptomatic Influenza A/B, RSV, & SARS-CoV2 PCR (COVID-19) Nose    External hemorrhoids  refill  - hydrocortisone, Perianal, (ANUSOL-HC) 2.5 % cream  Dispense: 30 g; Refill: 3      Return if symptoms worsen or fail to improve.    Esdras Angeles is a 64 year old, presenting for the following health issues:  URI (Pt is c/o a sore throat and cough X1 week, pt has not taken any medications to help her symptoms)        3/27/2024    10:04 AM   Additional Questions   Roomed by Morelia   Accompanied by          3/27/2024    Information    services provided? Yes   Language Other   Other cantonese   Type of interpretation provided Face-to-face     HPI     Acute Illness  Acute illness concerns: sore throat, dry cough, nasal discharge  Onset/Duration:  x1 week  Symptoms:  Fever: No  Chills/Sweats: No  Headache (location?): No  Sinus Pressure: YES  Conjunctivitis:  No  Ear Pain: no  Rhinorrhea: YES  Congestion: YES  Sore Throat: YES  Cough: YES-non-productive  Wheeze: No  Decreased Appetite: No  Nausea: No  Vomiting: No  Diarrhea: No  Dysuria/Freq.: No  Dysuria or Hematuria: No  Fatigue/Achiness: No  Sick/Strep Exposure: YES \"pneumonia\" (unsure of bacteria vs " "viral)  Therapies tried and outcome: None        Objective    BP 97/63 (BP Location: Left arm, Patient Position: Sitting, Cuff Size: Adult Small)   Pulse 89   Temp 98.6  F (37  C) (Oral)   Ht 1.549 m (5' 1\")   SpO2 98%   BMI 16.67 kg/m    Body mass index is 16.67 kg/m .  Physical Exam   GENERAL: alert and no distress  EYES: Eyes grossly normal to inspection, PERRL and conjunctivae and sclerae normal  HENT: normal cephalic/atraumatic, ear canals and TM's normal, nose and mouth without ulcers or lesions, nasal mucosa edematous , rhinorrhea clear, oropharynx clear, oral mucous membranes moist, and no tonsil exudate or swelling, no palate petechiae   NECK: no adenopathy, no asymmetry, masses, or scars  RESP: lungs clear to auscultation - no rales, rhonchi or wheezes  CV: regular rate and rhythm, normal S1 S2, no S3 or S4, no murmur, click or rub, no peripheral edema  MS: no gross musculoskeletal defects noted, no edema    No results found for this or any previous visit (from the past 24 hour(s)).        Signed Electronically by: Jacque Wilkinson MD    "

## 2024-03-27 NOTE — RESULT ENCOUNTER NOTE
Could you call patient and tell her that her rapid flu is negative? Covid flu rsv in process  Thank you!

## 2024-03-27 NOTE — PROGRESS NOTES
Preceptor Attestation:   Patient seen, evaluated and discussed with the resident. I have verified the content of the note, which accurately reflects my assessment of the patient and the plan of care.   Supervising Physician:  Timoteo Barnes MD

## 2024-03-28 ENCOUNTER — TELEPHONE (OUTPATIENT)
Dept: FAMILY MEDICINE | Facility: CLINIC | Age: 64
End: 2024-03-28
Payer: COMMERCIAL

## 2024-03-28 NOTE — TELEPHONE ENCOUNTER
Attempted to call patient to relay provider message regarding negative Influenza, RSV and COVID all negative.    Unable to leave a message    If patient calls back please relay negative results    Luz Cervantes RN

## 2024-03-28 NOTE — TELEPHONE ENCOUNTER
----- Message from Jacque Wilkinson MD sent at 3/27/2024 12:42 PM CDT -----  Could you call patient and tell her that her rapid flu is negative? Covid flu rsv in process  Thank you!

## 2024-03-29 NOTE — TELEPHONE ENCOUNTER
Called patient to relay negative lab results, patient expressed she is feeling better, still has nasal drainge and chest congestion, suggested increasing water and drinking warm tea with honey.    Luz Cervantes RN

## 2024-04-03 ENCOUNTER — TELEPHONE (OUTPATIENT)
Dept: FAMILY MEDICINE | Facility: CLINIC | Age: 64
End: 2024-04-03
Payer: COMMERCIAL

## 2024-04-03 NOTE — RESULT ENCOUNTER NOTE
Hello,  I told patient she would get a call with her test results, even if negative. I know this is not clinic workflow, so I apologize. Could you call patient with her results?  -Dr. Wilkinson

## 2024-04-03 NOTE — TELEPHONE ENCOUNTER
----- Message from Jacque Wilkinson MD sent at 4/3/2024  9:18 AM CDT -----  Hello,  I told patient she would get a call with her test results, even if negative. I know this is not clinic workflow, so I apologize. Could you call patient with her results?  -Dr. Wilkinson

## 2024-04-25 ENCOUNTER — DOCUMENTATION ONLY (OUTPATIENT)
Dept: FAMILY MEDICINE | Facility: CLINIC | Age: 64
End: 2024-04-25
Payer: COMMERCIAL

## 2024-04-30 NOTE — PROGRESS NOTES
Annual home visit completed 4/15/2024.  Shavonne lives alone in one bedroom apt.  Her sister who is involved in her cares was also present at the visit. Completed HRA/LTCC, OBRA, POC - refer to docs on file for detail. She continues to need assistance with some of her ADLs and IADLs. Recent dental check up, is due for eye exam, has glasses.  Reports fall once when she was outside. Supplies include walker, bathroom grab bars.  She has services through her DD waiver and also has a DD .  Safe disposal of meds discussed and copy of sites given to client.  CC reviewed and received expressed/verbal approval by member of the care plan, including choice of services and providers, choices related to sharing the plan or portions of the plan with others, appeals rights, and data privacy information. POC completed.  Revisit in six months

## 2024-05-31 DIAGNOSIS — H04.123 DRY EYES: ICD-10-CM

## 2024-06-02 RX ORDER — CARBOXYMETHYLCELLULOSE SODIUM 5 MG/ML
SOLUTION/ DROPS OPHTHALMIC
Qty: 50 EACH | Refills: 1 | Status: SHIPPED | OUTPATIENT
Start: 2024-06-02 | End: 2024-08-21

## 2024-08-20 DIAGNOSIS — Z00.00 ROUTINE GENERAL MEDICAL EXAMINATION AT A HEALTH CARE FACILITY: ICD-10-CM

## 2024-08-20 RX ORDER — POLYETHYLENE GLYCOL 3350 17 G/17G
POWDER, FOR SOLUTION ORAL
Qty: 510 G | Refills: 1 | Status: SHIPPED | OUTPATIENT
Start: 2024-08-20

## 2024-08-20 NOTE — TELEPHONE ENCOUNTER
"Request for medication refill:  polyethylene glycol (MIRALAX) 17 GM/Dose powder     Providers if patient needs an appointment and you are willing to give a one month supply please refill for one month and  send a letter/MyChart using \".SMILLIMITEDREFILL\" .smillimited and route chart to \"[facility] \" (Giving one month refill in non controlled medications is strongly recommended before denial)    If refill has been denied, meaning absolutely no refills without visit, please complete the smart phrase \".smirxrefuse\" and route it to the \"P [facility] MED REFILLS\"  pool to inform the patient and the pharmacy.    Rico Arriola, CMA      "

## 2024-08-21 DIAGNOSIS — H04.123 DRY EYES: ICD-10-CM

## 2024-08-22 RX ORDER — CARBOXYMETHYLCELLULOSE SODIUM 5 MG/ML
1 SOLUTION/ DROPS OPHTHALMIC DAILY PRN
Qty: 50 EACH | Refills: 5 | Status: SHIPPED | OUTPATIENT
Start: 2024-08-22

## 2024-12-03 ENCOUNTER — TELEPHONE (OUTPATIENT)
Dept: FAMILY MEDICINE | Facility: CLINIC | Age: 64
End: 2024-12-03
Payer: COMMERCIAL

## 2024-12-03 DIAGNOSIS — Z00.00 ROUTINE GENERAL MEDICAL EXAMINATION AT A HEALTH CARE FACILITY: ICD-10-CM

## 2024-12-03 RX ORDER — POLYETHYLENE GLYCOL 3350 17 G/17G
17 POWDER, FOR SOLUTION ORAL DAILY
Qty: 510 G | Refills: 11 | Status: SHIPPED | OUTPATIENT
Start: 2024-12-03

## 2024-12-31 ENCOUNTER — OFFICE VISIT (OUTPATIENT)
Dept: FAMILY MEDICINE | Facility: CLINIC | Age: 64
End: 2024-12-31
Payer: COMMERCIAL

## 2024-12-31 VITALS
HEART RATE: 76 BPM | BODY MASS INDEX: 17.35 KG/M2 | WEIGHT: 91.9 LBS | SYSTOLIC BLOOD PRESSURE: 99 MMHG | TEMPERATURE: 98.5 F | RESPIRATION RATE: 16 BRPM | DIASTOLIC BLOOD PRESSURE: 65 MMHG | HEIGHT: 61 IN | OXYGEN SATURATION: 98 %

## 2024-12-31 DIAGNOSIS — G47.00 INSOMNIA, UNSPECIFIED TYPE: ICD-10-CM

## 2024-12-31 DIAGNOSIS — R05.1 ACUTE COUGH: ICD-10-CM

## 2024-12-31 DIAGNOSIS — Z78.0 ASYMPTOMATIC POSTMENOPAUSAL STATUS: Primary | ICD-10-CM

## 2024-12-31 DIAGNOSIS — Z20.822 SUSPECTED 2019 NOVEL CORONAVIRUS INFECTION: ICD-10-CM

## 2024-12-31 DIAGNOSIS — Z00.00 ROUTINE GENERAL MEDICAL EXAMINATION AT A HEALTH CARE FACILITY: ICD-10-CM

## 2024-12-31 LAB
ALBUMIN SERPL BCG-MCNC: 4.6 G/DL (ref 3.5–5.2)
ALP SERPL-CCNC: 71 U/L (ref 40–150)
ALT SERPL W P-5'-P-CCNC: 11 U/L (ref 0–50)
ANION GAP SERPL CALCULATED.3IONS-SCNC: 9 MMOL/L (ref 7–15)
AST SERPL W P-5'-P-CCNC: 22 U/L (ref 0–45)
BILIRUB SERPL-MCNC: 0.4 MG/DL
BUN SERPL-MCNC: 12.2 MG/DL (ref 8–23)
CALCIUM SERPL-MCNC: 9.9 MG/DL (ref 8.8–10.4)
CHLORIDE SERPL-SCNC: 101 MMOL/L (ref 98–107)
CREAT SERPL-MCNC: 0.66 MG/DL (ref 0.51–0.95)
EGFRCR SERPLBLD CKD-EPI 2021: >90 ML/MIN/1.73M2
ERYTHROCYTE [DISTWIDTH] IN BLOOD BY AUTOMATED COUNT: 11.7 % (ref 10–15)
GLUCOSE SERPL-MCNC: 108 MG/DL (ref 70–99)
HCO3 SERPL-SCNC: 25 MMOL/L (ref 22–29)
HCT VFR BLD AUTO: 37.4 % (ref 35–47)
HGB BLD-MCNC: 12.2 G/DL (ref 11.7–15.7)
MCH RBC QN AUTO: 30.7 PG (ref 26.5–33)
MCHC RBC AUTO-ENTMCNC: 32.6 G/DL (ref 31.5–36.5)
MCV RBC AUTO: 94 FL (ref 78–100)
PLATELET # BLD AUTO: 229 10E3/UL (ref 150–450)
POTASSIUM SERPL-SCNC: 4.4 MMOL/L (ref 3.4–5.3)
PROT SERPL-MCNC: 7.4 G/DL (ref 6.4–8.3)
RBC # BLD AUTO: 3.98 10E6/UL (ref 3.8–5.2)
SODIUM SERPL-SCNC: 135 MMOL/L (ref 135–145)
VIT D+METAB SERPL-MCNC: 9 NG/ML (ref 20–50)
WBC # BLD AUTO: 4.7 10E3/UL (ref 4–11)

## 2024-12-31 RX ORDER — BENZONATATE 100 MG/1
100-200 CAPSULE ORAL 3 TIMES DAILY PRN
Qty: 30 CAPSULE | Refills: 3 | Status: SHIPPED | OUTPATIENT
Start: 2024-12-31

## 2024-12-31 RX ORDER — TRAZODONE HYDROCHLORIDE 50 MG/1
50 TABLET, FILM COATED ORAL
Qty: 30 TABLET | Refills: 11 | Status: SHIPPED | OUTPATIENT
Start: 2024-12-31

## 2024-12-31 RX ORDER — ACETAMINOPHEN 500 MG
500-1000 TABLET ORAL EVERY 6 HOURS PRN
Qty: 100 TABLET | Refills: 1 | Status: SHIPPED | OUTPATIENT
Start: 2024-12-31

## 2024-12-31 SDOH — HEALTH STABILITY: PHYSICAL HEALTH: ON AVERAGE, HOW MANY DAYS PER WEEK DO YOU ENGAGE IN MODERATE TO STRENUOUS EXERCISE (LIKE A BRISK WALK)?: 0 DAYS

## 2024-12-31 ASSESSMENT — SOCIAL DETERMINANTS OF HEALTH (SDOH): HOW OFTEN DO YOU GET TOGETHER WITH FRIENDS OR RELATIVES?: PATIENT DECLINED

## 2024-12-31 NOTE — PROGRESS NOTES
Preventive Care Visit  [facility] SUBHA Tucker MD, Family Medicine  Dec 31, 2024      Assessment & Plan   1. Routine general medical examination at a health care facility  - Here for medicare wellness visit, preventative care and guidance provided  - Vaccines as below  - Patient requested annual labs, reviewed possible labs and collected below given personal and family history  - Abnormal clock draw and 2 out of 3 for memory, consider more intensive evaluation at follow up. Per sister and patient no acute changes to memory.   - TDAP 10-64Y (ADACEL,BOOSTRIX)  - INFLUENZA HIGH DOSE, TRIVALENT, PF (FLUZONE)  - COVID-19 12+ (PFIZER)  - Pneumococcal 20 Valent Conjugate (PCV20)  - Comprehensive metabolic panel; Future  - CBC with platelets; Future  - Vitamin D Level    2. Asymptomatic postmenopausal status (Primary)  - DEXA screening for osteoporosis  - DEXA HIP/PELVIS/SPINE - Future; Future  - acetaminophen (TYLENOL) 500 MG tablet; Take 1-2 tablets (500-1,000 mg) by mouth every 6 hours as needed for mild pain.  Dispense: 100 tablet; Refill: 1    3. Suspected 2019 novel coronavirus infection  4. Acute cough  - Has chronic cough, uses intermittent benzonatate for symptom release, refill provided  - acetaminophen (TYLENOL) 500 MG tablet; Take 1-2 tablets (500-1,000 mg) by mouth every 6 hours as needed for mild pain.  Dispense: 100 tablet; Refill: 1  - benzonatate (TESSALON) 100 MG capsule; Take 1-2 capsules (100-200 mg) by mouth 3 times daily as needed for cough.  Dispense: 30 capsule; Refill: 3    5. Insomnia, unspecified type  - Refill of chronic medication  - traZODone (DESYREL) 50 MG tablet; Take 1 tablet (50 mg) by mouth nightly as needed for sleep.  Dispense: 30 tablet; Refill: 11      Counseling  Appropriate preventive services were addressed with this patient via screening, questionnaire, or discussion as appropriate for fall prevention, nutrition, physical activity, Tobacco-use cessation,  social engagement, weight loss and cognition.  Checklist reviewing preventive services available has been given to the patient.  Reviewed patient's diet, addressing concerns and/or questions.   Discussed possible causes of fatigue. Updated plan of care.  Patient reported difficulty with activities of daily living were addressed today.The patient was provided with written information regarding signs of hearing loss.   Information on urinary incontinence and treatment options given to patient.     Return in about 53 weeks (around 1/6/2026) for Annual Wellness Visit.    Esdras Angeles is a 64 year old, presenting for the following:  Physical (Annual wellness visit )        12/31/2024     4:18 PM   Additional Questions   Roomed by Radhak   Accompanied by sister         12/31/2024    Information    services provided? Yes   Language Other   Other cantonese   Type of interpretation provided Face-to-face    name Edith Allan (Betty)    [employer]  No acute concerns today. Would like to discuss lab work and medication refils. Uses tessalon perles intermittently for subacute cough. Tylenol and ibuprofen for pain      Health Care Directive  Patient does not have a Health Care Directive: Discussed advance care planning with patient; information given to patient to review.      12/31/2024   General Health   How would you rate your overall physical health? (!) FAIR   Feel stress (tense, anxious, or unable to sleep) Not at all         12/31/2024   Nutrition   Diet: Breakfast skipped         12/31/2024   Exercise   Days per week of moderate/strenous exercise 0 days   (!) EXERCISE CONCERN      12/31/2024   Social Factors   Frequency of gathering with friends or relatives Patient declined   Worry food won't last until get money to buy more No   Food not last or not have enough money for food? No   Do you have housing? (Housing is defined as stable permanent housing and  does not include staying ouside in a car, in a tent, in an abandoned building, in an overnight shelter, or couch-surfing.) No   Are you worried about losing your housing? No   Lack of transportation? No   Unable to get utilities (heat,electricity)? No   Want help with housing or utility concern? No   (!) HOUSING CONCERN PRESENT      12/31/2024   Fall Risk   Fallen 2 or more times in the past year? No   Trouble with walking or balance? No          12/31/2024   Activities of Daily Living- Home Safety   Needs help with the following daily activites Transportation    Shopping    Preparing meals    Housework    Bathing    Laundry    Medication administration    Money management    Toileting   Safety concerns in the home None of the above       Multiple values from one day are sorted in reverse-chronological order         12/31/2024   Dental   Dentist two times every year? Yes         12/31/2024   Hearing Screening   Hearing concerns? (!) I NEED TO ASK PEOPLE TO SPEAK UP OR REPEAT THEMSELVES.    (!) IT'S HARD TO FOLLOW A CONVERSATION IN A NOISY RESTAURANT OR CROWDED ROOM.    (!) TROUBLE UNDESTANDING A SPEAKER IN A PUBLIC MEETING OR [employer]ICE.    (!) TROUBLE UNDERSTANDING SOFT OR WHISPERED SPEECH.    (!) TROUBLE UNDERSTANDING SPEECH ON THE TELEPHONE    Not interested in hearing aids at this time. Says that she can function if the person repeats the information.      Multiple values from one day are sorted in reverse-chronological order         12/31/2024   Driving Risk Screening   Patient/family members have concerns about driving No         12/31/2024   General Alertness/Fatigue Screening   Have you been more tired than usual lately? (!) YES         12/31/2024   Urinary Incontinence Screening   Bothered by leaking urine in past 6 months Yes            Today's PHQ-2 Score:       12/31/2024     4:12 PM   PHQ-2 ( 1999 Pfizer)   Q1: Little interest or pleasure in doing things 0    Q2: Feeling down, depressed or  hopeless 0    PHQ-2 Score 0    Q1: Little interest or pleasure in doing things Not at all   Q2: Feeling down, depressed or hopeless Not at all   PHQ-2 Score 0       Proxy-reported           2024   Substance Use   Alcohol more than 3/day or more than 7/wk Not Applicable   Do you have a current opioid prescription? No   How severe/bad is pain from 1 to 10? 5/10   Do you use any other substances recreationally? No     Social History     Tobacco Use    Smoking status: Never    Smokeless tobacco: Never   Substance Use Topics    Alcohol use: No    Drug use: No          Mammogram Screening - Mammogram every 1-2 years updated in Health Maintenance based on mutual decision making      History of abnormal Pap smear: No - age 65 or older with pap indicated due to inadequate prior screening (3 consecutive negative cytology results, 2 consecutive negative cotesting results, or 2 consecutive negative HrHPV test results within 10 years, with the most recent test occurring within the recommended screening interval for the test used)    - Discussed risks/benefits of screening, patient declined       ASCVD Risk   The 10-year ASCVD risk score (Claude HILL, et al., 2019) is: 2.9%    Values used to calculate the score:      Age: 64 years      Sex: Female      Is Non- : No      Diabetic: No      Tobacco smoker: No      Systolic Blood Pressure: 99 mmHg      Is BP treated: No      HDL Cholesterol: 64 mg/dL      Total Cholesterol: 166 mg/dL    Fracture Risk Assessment Tool  Link to Frax Calculator  Use the information below to complete the Frax calculator  : 1960  Sex: female  Weight (kg): 41.7 kg (actual weight)  Height (cm): 154.9 cm  Previous Fragility Fracture:  No  History of parent with fractured hip:  No  Current Smoking:  No  Patient has been on glucocorticoids for more than 3 months (5mg/day or more): No  Rheumatoid Arthritis on Problem List:  No  Secondary Osteoporosis on Problem List:   "No  Consumes 3 or more units of alcohol per day: No  Femoral Neck BMD (g/cm2)            Reviewed and updated as needed this visit by Provider   Tobacco      Surg Hx  Fam Hx              Current providers sharing in care for this patient include:  Patient Care Team:  Bob Ventura MD as PCP - General (Family Medicine)  Bob Ventura MD as Assigned PCP    The following health maintenance items are reviewed in Epic and correct as of today:  Health Maintenance   Topic Date Due    DEXA  Never done    PHQ-2 (once per calendar year)  01/01/2025    MEDICARE ANNUAL WELLNESS VISIT  12/31/2025    FALL RISK ASSESSMENT  12/31/2025    COLORECTAL CANCER SCREENING  11/16/2027    GLUCOSE  12/31/2027    LIPID  10/12/2028    ADVANCE CARE PLANNING  10/17/2028    RSV VACCINE (1 - 1-dose 75+ series) 03/06/2034    DTAP/TDAP/TD IMMUNIZATION (3 - Td or Tdap) 12/31/2034    HEPATITIS C SCREENING  Completed    HIV SCREENING  Completed    INFLUENZA VACCINE  Completed    Pneumococcal Vaccine: 50+ Years  Completed    ZOSTER IMMUNIZATION  Completed    COVID-19 Vaccine  Completed    HPV IMMUNIZATION  Aged Out    MENINGITIS IMMUNIZATION  Aged Out    RSV MONOCLONAL ANTIBODY  Aged Out    MAMMO SCREENING  Discontinued          Objective    Exam  BP 99/65   Pulse 76   Temp 98.5  F (36.9  C) (Oral)   Resp 16   Ht 1.549 m (5' 1\")   Wt 41.7 kg (91 lb 14.4 oz)   SpO2 98%   BMI 17.36 kg/m     Estimated body mass index is 17.36 kg/m  as calculated from the following:    Height as of this encounter: 1.549 m (5' 1\").    Weight as of this encounter: 41.7 kg (91 lb 14.4 oz).    Physical Exam  General No acute distress, atraumatic  Resp: No respiratory distress, no accessory muscle use  Card: well perfused, no cyanosis  Neuro: Moving all extremities  Psych: Normal mood, appropriate affect          12/31/2024   Mini Cog   Clock Draw Score 0 Abnormal   3 Item Recall 2 objects recalled   Mini Cog Total Score 2             Signed Electronically by: " Sivakumar Tucker MD

## 2024-12-31 NOTE — LETTER
January 2, 2025      Bridgette Carl  [id number]  [facility] [id number]        Dear Bridgette,    Thank you for getting your care at [facility]. Please see below for your test results.    Resulted Orders   Comprehensive metabolic panel   Result Value Ref Range    Sodium 135 135 - 145 mmol/L    Potassium 4.4 3.4 - 5.3 mmol/L    Carbon Dioxide (CO2) 25 22 - 29 mmol/L    Anion Gap 9 7 - 15 mmol/L    Urea Nitrogen 12.2 8.0 - 23.0 mg/dL    Creatinine 0.66 0.51 - 0.95 mg/dL    GFR Estimate >90 >60 mL/min/1.73m2      Comment:      eGFR calculated using 2021 CKD-EPI equation.    Calcium 9.9 8.8 - 10.4 mg/dL      Comment:      Reference intervals for this test were updated on 7/16/2024 to reflect our healthy population more accurately. There may be differences in the flagging of prior results with similar values performed with this method. Those prior results can be interpreted in the context of the updated reference intervals.    Chloride 101 98 - 107 mmol/L    Glucose 108 (H) 70 - 99 mg/dL    Alkaline Phosphatase 71 40 - 150 U/L    AST 22 0 - 45 U/L    ALT 11 0 - 50 U/L    Protein Total 7.4 6.4 - 8.3 g/dL    Albumin 4.6 3.5 - 5.2 g/dL    Bilirubin Total 0.4 <=1.2 mg/dL   CBC with platelets   Result Value Ref Range    WBC Count 4.7 4.0 - 11.0 10e3/uL    RBC Count 3.98 3.80 - 5.20 10e6/uL    Hemoglobin 12.2 11.7 - 15.7 g/dL    Hematocrit 37.4 35.0 - 47.0 %    MCV 94 78 - 100 fL    MCH 30.7 26.5 - 33.0 pg    MCHC 32.6 31.5 - 36.5 g/dL    RDW 11.7 10.0 - 15.0 %    Platelet Count 229 150 - 450 10e3/uL   Vitamin D Level   Result Value Ref Range    Vitamin D, Total (25-Hydroxy) 9 (L) 20 - 50 ng/mL      Comment:      severe deficiency    Narrative    Season, race, dietary intake, and treatment affect the concentration of 25-hydroxy-Vitamin D. Values may decrease during winter months and increase during summer months.    Vitamin D determination is routinely performed by an immunoassay specific for 25 hydroxyvitamin D3.  If  an individual is on vitamin D2(ergocalciferol) supplementation, please specify 25 OH vitamin D2 and D3 level determination by LCMSMS test [id number].       Your liver and kidney numbers are all in a good range, as as your blood counts (hemoglobin that looks for anemia and white blood cells that are signs of inflammation and infection).    Your Vitamin D level is low, I have sent a prescription for a daily supplement to help with this.    Your sugar level was read as high, but as we discussed it is in a normal range for having eaten. It is not high enough to be concerned for diabetes at this time.     I would encourage you to return to clinic in 3 months to check your vitamin D levels and discussed your medical care further.    Sincerely,    Sivakumar Tucker MD

## 2024-12-31 NOTE — PATIENT INSTRUCTIONS
Patient Education   Preventive Care Advice   This is general advice given by our system to help you stay healthy. However, your care team may have specific advice just for you. Please talk to your care team about your preventive care needs.  Nutrition  Eat 5 or more servings of fruits and vegetables each day.  Try wheat bread, brown rice and whole grain pasta (instead of white bread, rice, and pasta).  Get enough calcium and vitamin D. Check the label on foods and aim for 100% of the RDA (recommended daily allowance).  Lifestyle  Exercise at least 150 minutes each week  (30 minutes a day, 5 days a week).  Do muscle strengthening activities 2 days a week. These help control your weight and prevent disease.  No smoking.  Wear sunscreen to prevent skin cancer.  Have a dental exam and cleaning every 6 months.  Yearly exams  See your health care team every year to talk about:  Any changes in your health.  Any medicines your care team has prescribed.  Preventive care, family planning, and ways to prevent chronic diseases.  Shots (vaccines)   HPV shots (up to age 26), if you've never had them before.  Hepatitis B shots (up to age 59), if you've never had them before.  COVID-19 shot: Get this shot when it's due.  Flu shot: Get a flu shot every year.  Tetanus shot: Get a tetanus shot every 10 years.  Pneumococcal, hepatitis A, and RSV shots: Ask your care team if you need these based on your risk.  Shingles shot (for age 50 and up)  General health tests  Diabetes screening:  Starting at age 35, Get screened for diabetes at least every 3 years.  If you are younger than age 35, ask your care team if you should be screened for diabetes.  Cholesterol test: At age 39, start having a cholesterol test every 5 years, or more often if advised.  Bone density scan (DEXA): At age 50, ask your care team if you should have this scan for osteoporosis (brittle bones).  Hepatitis C: Get tested at least once in your life.  STIs (sexually  transmitted infections)  Before age 24: Ask your care team if you should be screened for STIs.  After age 24: Get screened for STIs if you're at risk. You are at risk for STIs (including HIV) if:  You are sexually active with more than one person.  You don't use condoms every time.  You or a partner was diagnosed with a sexually transmitted infection.  If you are at risk for HIV, ask about PrEP medicine to prevent HIV.  Get tested for HIV at least once in your life, whether you are at risk for HIV or not.  Cancer screening tests  Cervical cancer screening: If you have a cervix, begin getting regular cervical cancer screening tests starting at age 21.  Breast cancer scan (mammogram): If you've ever had breasts, begin having regular mammograms starting at age 40. This is a scan to check for breast cancer.  Colon cancer screening: It is important to start screening for colon cancer at age 45.  Have a colonoscopy test every 10 years (or more often if you're at risk) Or, ask your provider about stool tests like a FIT test every year or Cologuard test every 3 years.  To learn more about your testing options, visit:   .  For help making a decision, visit:   https://bit.ly/cn25607.  Prostate cancer screening test: If you have a prostate, ask your care team if a prostate cancer screening test (PSA) at age 55 is right for you.  Lung cancer screening: If you are a current or former smoker ages 50 to 80, ask your care team if ongoing lung cancer screenings are right for you.  For informational purposes only. Not to replace the advice of your health care provider. Copyright   2023 [facility]. All rights reserved. Clinically reviewed by the [facility] Transitions Program. eelusion [id number] - REV 01/24.  Learning About Activities of Daily Living  What are activities of daily living?     Activities of daily living (ADLs) are the basic self-care tasks you do every day. These include eating, bathing, dressing,  and moving around.  As you age, and if you have health problems, you may find that it's harder to do some of these tasks. If so, your doctor can suggest ideas that may help.  To measure what kind of help you may need, your doctor will ask how well you are able to do ADLs. Let your doctor know if there are any tasks that you are having trouble doing. This is an important first step to getting help. And when you have the help you need, you can stay as independent as possible.  How will a doctor assess your ADLs?  Asking about ADLs is part of a routine health checkup your doctor will likely do as you age. Your health check might be done in a doctor's office, in your home, or at a hospital. The goal is to find out if you are having any problems that could make it hard to care for yourself or that make it unsafe for you to be on your own.  To measure your ADLs, your doctor will ask how hard it is for you to do routine tasks. Your doctor may also want to know if you have changed the way you do a task because of a health problem. Your doctor may watch how you:  Walk back and forth.  Keep your balance while you stand or walk.  Move from sitting to standing or from a bed to a chair.  Button or unbutton a shirt or sweater.  Remove and put on your shoes.  It's common to feel a little worried or anxious if you find you can't do all the things you used to be able to do. Talking with your doctor about ADLs is a way to make sure you're as safe as possible and able to care for yourself as well as you can. You may want to bring a caregiver, friend, or family member to your checkup. They can help you talk to your doctor.  Follow-up care is a key part of your treatment and safety. Be sure to make and go to all appointments, and call your doctor if you are having problems. It's also a good idea to know your test results and keep a list of the medicines you take.  Current as of: October 24, 2023  Content Version: 14.3    2024 [facility]  [employer].   Care instructions adapted under license by your healthcare professional. If you have questions about a medical condition or this instruction, always ask your healthcare professional. [employer] disclaims any warranty or liability for your use of this information.    Hearing Loss: Care Instructions  Overview     Hearing loss is a sudden or slow decrease in how well you hear. It can range from slight to profound. Permanent hearing loss can occur with aging. It also can happen when you are exposed long-term to loud noise. Examples include listening to loud music, riding motorcycles, or being around other loud machines.  Hearing loss can affect your work and home life. It can make you feel lonely or depressed. You may feel that you have lost your independence. But hearing aids and other devices can help you hear better and feel connected to others.  Follow-up care is a key part of your treatment and safety. Be sure to make and go to all appointments, and call your doctor if you are having problems. It's also a good idea to know your test results and keep a list of the medicines you take.  How can you care for yourself at home?  Avoid loud noises whenever possible. This helps keep your hearing from getting worse.  Always wear hearing protection around loud noises.  Wear a hearing aid as directed.  A professional can help you pick a hearing aid that will work best for you.  You can also get hearing aids over the counter for mild to moderate hearing loss.  Have hearing tests as your doctor suggests. They can show whether your hearing has changed. Your hearing aid may need to be adjusted.  Use other devices as needed. These may include:  Telephone amplifiers and hearing aids that can connect to a television, stereo, radio, or microphone.  Devices that use lights or vibrations. These alert you to the doorbell, a ringing telephone, or a baby monitor.  Television closed-captioning. This shows the  "words at the bottom of the screen. Most new TVs can do this.  TTY (text telephone). This lets you type messages back and forth on the telephone instead of talking or listening. These devices are also called TDD. When messages are typed on the keyboard, they are sent over the phone line to a receiving TTY. The message is shown on a monitor.  Use text messaging, social media, and email if it is hard for you to communicate by telephone.  Try to learn a listening technique called speechreading. It is not lipreading. You pay attention to people's gestures, expressions, posture, and tone of voice. These clues can help you understand what a person is saying. Face the person you are talking to, and have them face you. Make sure the lighting is good. You need to see the other person's face clearly.  Think about counseling if you need help to adjust to your hearing loss.  When should you call for help?  Watch closely for changes in your health, and be sure to contact your doctor if:    You think your hearing is getting worse.     You have new symptoms, such as dizziness or nausea.   Where can you learn more?  Go to https://www.[employer]net/patiented  Enter R798 in the search box to learn more about \"Hearing Loss: Care Instructions.\"  Current as of: September 27, 2023  Content Version: 14.3    2024 [employer].   Care instructions adapted under license by your healthcare professional. If you have questions about a medical condition or this instruction, always ask your healthcare professional. [employer] disclaims any warranty or liability for your use of this information.    Learning About Sleeping Well  What does sleeping well mean?     Sleeping well means getting enough sleep to feel good and stay healthy. How much sleep is enough varies among people.  The number of hours you sleep and how you feel when you wake up are both important. If you do not feel refreshed, you probably need more sleep. " "Another sign of not getting enough sleep is feeling tired during the day.  Experts recommend that adults get at least 7 or more hours of sleep per day. Children and older adults need more sleep.  Why is getting enough sleep important?  Getting enough quality sleep is a basic part of good health. When your sleep suffers, your physical health, mood, and your thoughts can suffer too. You may find yourself feeling more grumpy or stressed. Not getting enough sleep also can lead to serious problems, including injury, accidents, anxiety, and depression.  What might cause poor sleeping?  Many things can cause sleep problems, including:  Changes to your sleep schedule.  Stress. Stress can be caused by fear about a single event, such as giving a speech. Or you may have ongoing stress, such as worry about work or school.  Depression, anxiety, and other mental or emotional conditions.  Changes in your sleep habits or surroundings. This includes changes that happen where you sleep, such as noise, light, or sleeping in a different bed. It also includes changes in your sleep pattern, such as having jet lag or working a late shift.  Health problems, such as pain, breathing problems, and restless legs syndrome.  Lack of regular exercise.  Using alcohol, nicotine, or caffeine before bed.  How can you help yourself?  Here are some tips that may help you sleep more soundly and wake up feeling more refreshed.  Your sleeping area   Use your bedroom only for sleeping and sex. A bit of light reading may help you fall asleep. But if it doesn't, do your reading elsewhere in the house. Try not to use your TV, computer, smartphone, or tablet while you are in bed.  Be sure your bed is big enough to stretch out comfortably, especially if you have a sleep partner.  Keep your bedroom quiet, dark, and cool. Use curtains, blinds, or a sleep mask to block out light. To block out noise, use earplugs, soothing music, or a \"white noise\" machine.  Your " "evening and bedtime routine   Create a relaxing bedtime routine. You might want to take a warm shower or bath, or listen to soothing music.  Go to bed at the same time every night. And get up at the same time every morning, even if you feel tired.  What to avoid   Limit caffeine (coffee, tea, caffeinated sodas) during the day, and don't have any for at least 6 hours before bedtime.  Avoid drinking alcohol before bedtime. Alcohol can cause you to wake up more often during the night.  Try not to smoke or use tobacco, especially in the evening. Nicotine can keep you awake.  Limit naps during the day, especially close to bedtime.  Avoid lying in bed awake for too long. If you can't fall asleep or if you wake up in the middle of the night and can't get back to sleep within about 20 minutes, get out of bed and go to another room until you feel sleepy.  Avoid taking medicine right before bed that may keep you awake or make you feel hyper or energized. Your doctor can tell you if your medicine may do this and if you can take it earlier in the day.  If you can't sleep   Imagine yourself in a peaceful, pleasant scene. Focus on the details and feelings of being in a place that is relaxing.  Get up and do a quiet or boring activity until you feel sleepy.  Avoid drinking any liquids before going to bed to help prevent waking up often to use the bathroom.  Where can you learn more?  Go to https://www.[employer].net/patiented  Enter J942 in the search box to learn more about \"Learning About Sleeping Well.\"  Current as of: July 31, 2024  Content Version: 14.3    2024 [employer].   Care instructions adapted under license by your healthcare professional. If you have questions about a medical condition or this instruction, always ask your healthcare professional. [employer] disclaims any warranty or liability for your use of this information.    Bladder Training: Care Instructions  Your Care Instructions   "   Bladder training is used to treat urge incontinence and stress incontinence. Urge incontinence means that the need to urinate comes on so fast that you can't get to a toilet in time. Stress incontinence means that you leak urine because of pressure on your bladder. For example, it may happen when you laugh, cough, or lift something heavy.  Bladder training can increase how long you can wait before you have to urinate. It can also help your bladder hold more urine. And it can give you better control over the urge to urinate.  It is important to remember that bladder training takes a few weeks to a few months to make a difference. You may not see results right away, but don't give up.  Follow-up care is a key part of your treatment and safety. Be sure to make and go to all appointments, and call your doctor if you are having problems. It's also a good idea to know your test results and keep a list of the medicines you take.  How can you care for yourself at home?  Work with your doctor to come up with a bladder training program that is right for you. You may use one or more of the following methods.  Delayed urination  In the beginning, try to keep from urinating for 5 minutes after you first feel the need to go.  While you wait, take deep, slow breaths to relax. Kegel exercises can also help you delay the need to go to the bathroom.  After some practice, when you can easily wait 5 minutes to urinate, try to wait 10 minutes before you urinate.  Slowly increase the waiting period until you are able to control when you have to urinate.  Scheduled urination  Empty your bladder when you first wake up in the morning.  Schedule times throughout the day when you will urinate.  Start by going to the bathroom every hour, even if you don't need to go.  Slowly increase the time between trips to the bathroom.  When you have found a schedule that works well for you, keep doing it.  If you wake up during the night and have to  "urinate, do it. Apply your schedule to waking hours only.  Kegel exercises  These tighten and strengthen pelvic muscles, which can help you control the flow of urine. (If doing these exercises causes pain, stop doing them and talk with your doctor.) To do Kegel exercises:  Squeeze your muscles as if you were trying not to pass gas. Or squeeze your muscles as if you were stopping the flow of urine. Your belly, legs, and buttocks shouldn't move.  Hold the squeeze for 3 seconds, then relax for 5 to 10 seconds.  Start with 3 seconds, then add 1 second each week until you are able to squeeze for 10 seconds.  Repeat the exercise 10 times a session. Do 3 to 8 sessions a day.  When should you call for help?  Watch closely for changes in your health, and be sure to contact your doctor if:    Your incontinence is getting worse.     You do not get better as expected.   Where can you learn more?  Go to https://www.[employer].net/patiented  Enter V684 in the search box to learn more about \"Bladder Training: Care Instructions.\"  Current as of: April 30, 2024  Content Version: 14.3    2024 [employer].   Care instructions adapted under license by your healthcare professional. If you have questions about a medical condition or this instruction, always ask your healthcare professional. [employer] disclaims any warranty or liability for your use of this information.       "

## 2025-01-02 RX ORDER — CHOLECALCIFEROL (VITAMIN D3) 50 MCG
1 TABLET ORAL DAILY
Qty: 90 TABLET | Refills: 3 | Status: SHIPPED | OUTPATIENT
Start: 2025-01-02

## 2025-02-21 ENCOUNTER — OFFICE VISIT (OUTPATIENT)
Dept: FAMILY MEDICINE | Facility: CLINIC | Age: 65
End: 2025-02-21
Payer: COMMERCIAL

## 2025-02-21 VITALS
HEART RATE: 72 BPM | TEMPERATURE: 97.8 F | WEIGHT: 86.8 LBS | BODY MASS INDEX: 16.39 KG/M2 | SYSTOLIC BLOOD PRESSURE: 93 MMHG | DIASTOLIC BLOOD PRESSURE: 64 MMHG | OXYGEN SATURATION: 99 % | RESPIRATION RATE: 16 BRPM | HEIGHT: 61 IN

## 2025-02-21 DIAGNOSIS — R63.6 LOW WEIGHT: ICD-10-CM

## 2025-02-21 DIAGNOSIS — Z74.09 MOBILITY IMPAIRED: ICD-10-CM

## 2025-02-21 DIAGNOSIS — R53.81 PHYSICAL DECONDITIONING: ICD-10-CM

## 2025-02-21 DIAGNOSIS — R41.89 COGNITIVE IMPAIRMENT: Primary | ICD-10-CM

## 2025-02-21 DIAGNOSIS — R09.81 NASAL CONGESTION: ICD-10-CM

## 2025-02-21 DIAGNOSIS — R09.82 POSTNASAL DRIP: ICD-10-CM

## 2025-02-21 DIAGNOSIS — R63.4 WEIGHT LOSS: ICD-10-CM

## 2025-02-21 PROCEDURE — 3074F SYST BP LT 130 MM HG: CPT

## 2025-02-21 PROCEDURE — 99214 OFFICE O/P EST MOD 30 MIN: CPT | Mod: GC

## 2025-02-21 PROCEDURE — 3078F DIAST BP <80 MM HG: CPT

## 2025-02-21 RX ORDER — CHOLECALCIFEROL (VITAMIN D3) 50 MCG
1 TABLET ORAL DAILY
Qty: 90 TABLET | Refills: 3 | Status: SHIPPED | OUTPATIENT
Start: 2025-02-21

## 2025-02-21 RX ORDER — ECHINACEA PURPUREA EXTRACT 125 MG
TABLET ORAL
Qty: 30 ML | Refills: 0 | Status: SHIPPED | OUTPATIENT
Start: 2025-02-21 | End: 2025-02-21

## 2025-02-21 RX ORDER — BENZONATATE 100 MG/1
100-200 CAPSULE ORAL 3 TIMES DAILY PRN
Qty: 30 CAPSULE | Refills: 3 | Status: SHIPPED | OUTPATIENT
Start: 2025-02-21

## 2025-02-21 RX ORDER — FLUTICASONE PROPIONATE 50 MCG
1 SPRAY, SUSPENSION (ML) NASAL DAILY
Qty: 18.2 ML | Refills: 1 | Status: SHIPPED | OUTPATIENT
Start: 2025-02-21

## 2025-02-21 RX ORDER — POLYETHYLENE GLYCOL 3350 17 G/17G
17 POWDER, FOR SOLUTION ORAL DAILY
Qty: 510 G | Refills: 11 | Status: SHIPPED | OUTPATIENT
Start: 2025-02-21

## 2025-02-21 RX ORDER — ACETAMINOPHEN 500 MG
500-1000 TABLET ORAL EVERY 6 HOURS PRN
Qty: 100 TABLET | Refills: 1 | Status: SHIPPED | OUTPATIENT
Start: 2025-02-21

## 2025-02-21 NOTE — PROGRESS NOTES
Assessment & Plan     Cognitive impairment  Has diagnosis listed in chart from previous PCP of mild cognitive impairment. Mini-cog score of 2 at her recent AWV. Further evaluation of cognitive status with SLUMS or MoCA is difficult with language barrier and limited patient interaction - patient's sister who is her primary caretaker translated for this visit. Discussed functional status today and patient is requiring assistance with all iADLs and most ADLs. Suspect she has progressed more to moderate/severe cognitive impairment.     Mobility impaired  Physical deconditioning  Has had two mechanical falls in the past six months. Sister suspects this is because Bridgette has been getting more deconditioned and legs are weaker than before. Sister has discussed with Bridgette's assisted living facility and all think that she would do well with a motorized scooter to move short distances at the facility. Placed order with DME documentation below. Also recommended re-establishing care with PT and OT - sister is able to bring her to outpatient appts. Bridgette continues to do some resistance band exercises at her facility, but would benefit from re-evaluation.   - Wheelchair Scooter Order; Future  - Physical Therapy  Referral; Future  - Occupational Therapy  Referral; Future    Low weight  Weight loss  Noted weight loss from 91 lb on 12/31/24 to 86 lb today. BMI of 16. Per sister, Bridgette is still eating about the same she normally does but in general has a poor appetite and only finishes 50% or less of her food per meal. She eats in a general common area with supervision at her facility. No known new pain, fevers, or night sweats. Suspect this may be related to progression of her dementia but will need to monitor closely. Recommend adding Boost supplement once daily and discussed increasing high protein and fat foods. Will have Bridgette and her sister return in about 3 weeks to follow up on weight loss. If worsening or  "no improvement with dietary changes, would consider lab work up.    Nasal congestion  Postnasal drip  - fluticasone (FLONASE) 50 MCG/ACT nasal spray; Spray 1 spray into both nostrils daily.    Return in about 4 weeks (around 3/21/2025) for Follow up on weight.    Esdras Angeles is a 64 year old, presenting for the following health issues:  RECHECK        2025    11:25 AM   Additional Questions   Roomed by Doua   Accompanied by Sister         2025   Forms   Any forms needing to be completed Yes         2025    11:25 AM   Patient Reported Additional Medications   Patient reports taking the following new medications n/a         2025    Information    services provided? No     HPI     Lives in apartment building alone. Staff comes to help her during the day, and sister usually stays until 11 or 12 at night, then goes home.   Sister also sometimes stays and sleeps on the small sofa  Sister puts medications in pill box and then throughout the day someone will hand her her medications  Staff does laundry, cooking. Bathing she usually does herself but does need help intermittently and needs someone watching her otherwise she forgets soap in her hair  Sister handles all her finances.   Pt used to drive just locally, but for many years has not driven    Has fallen twice at apartment building, last time two months ago. Legs are weak.   Went to PT and OT two years ago. Has still been doing some resistance band exercises with staff at her assisted living    Wants electric scooter order sent to [facility] on [address] , [place] [address]      Mini Co/31/2024     4:24 PM   Mini-Cog Total Score   Mini Cog Total Score 2             Objective    BP 93/64   Pulse 72   Temp 97.8  F (36.6  C) (Oral)   Resp 16   Ht 1.549 m (5' 1\")   Wt 39.4 kg (86 lb 12.8 oz)   SpO2 99%   BMI 16.40 kg/m    Body mass index is 16.4 kg/m .  Physical Exam  Vitals reviewed. "   Constitutional:       General: She is not in acute distress.     Appearance: Normal appearance. She is not ill-appearing.   HENT:      Head: Normocephalic and atraumatic.   Eyes:      General: No scleral icterus.     Conjunctiva/sclera: Conjunctivae normal.   Cardiovascular:      Rate and Rhythm: Normal rate.   Pulmonary:      Effort: Pulmonary effort is normal.   Skin:     General: Skin is warm and dry.   Neurological:      General: No focal deficit present.      Mental Status: She is alert.   Psychiatric:      Comments: Unable to assess, sister talked throughout visit. Patient made eye contact but had overall flat and calm affect            Signed Electronically by: Bob Ventura MD    DME (Durable Medical Equipment) Orders and Documentation  No orders of the defined types were placed in this encounter.     The patient was assessed and it was determined the patient is in need of the following listed DME Supplies/Equipment. Please complete supporting documentation below to demonstrate medical necessity.      Length of need (# of month): 24 months    Diagnosis Codes (ICD-10): R53.81, Z74.09    1- Does the pt require and use a wheelchair to move around their residence? Yes, intermittently using for safety reasons (high fall risk)    2- Does the pt have quadriplegia, a fixed hip angle, a trunk cast or brace, excessive extensor tone of the trunk muscles or a need to rest in a recumbent position two or more times during the day? No     3- Does the pt have a cast, brace or musculoskeletal condition, which prevents 90 degree flexion of the knee, or does the pt have significant edema of the lower extremities that requires an elevating leg rest, or is a reclining back ordered? No     4- Does the pt have a need for arm height different than available using non-adjustable arms? No     5- How many hours per day does the pt usually spend in the wheelchair? 3 hours    6- Does the pt have severe weakness of the upper  extremities due to a neurologic, muscular, or cardiopulmonary disease/condition? No     7- Is the pt unable to operate any type of manual wheelchair? Yes

## 2025-04-14 ENCOUNTER — DOCUMENTATION ONLY (OUTPATIENT)
Dept: FAMILY MEDICINE | Facility: CLINIC | Age: 65
End: 2025-04-14
Payer: MEDICARE

## 2025-04-15 NOTE — PROGRESS NOTES
Annual home visit completed today.   Shavonne lives alone in one bedroom apt.  Her sister checks in her her often and advocates for her. [facility]. Continues to have bilateral knee pains and has fallen recently in her apt.  She is hoping to get a scooter soon. Safe disposal of meds discussed and list of sites. CC reviewed and received expressed/verbal approval by member of the care plan, including choice of services and providers, choices related to sharing the plan or portions of the plan with others, appeals rights, and data privacy information.  Revisit in six months.

## 2025-05-12 DIAGNOSIS — K64.4 EXTERNAL HEMORRHOIDS: ICD-10-CM

## 2025-05-13 RX ORDER — HYDROCORTISONE 25 MG/G
CREAM TOPICAL
Qty: 28 G | Refills: 3 | Status: SHIPPED | OUTPATIENT
Start: 2025-05-13

## 2025-05-13 NOTE — TELEPHONE ENCOUNTER
"Request for medication refill:    Medication Name:     hydrocortisone, Perianal, (ANUSOL-HC) 2.5 % cream       Providers if patient needs an appointment and you are willing to give a one month supply please refill for one month and  send a MyChart using \".SMILLIMITEDREFILL\" .Or route chart to \"P SMI \" . To call patient and inform of limited refill and providers request to make an appointment. (Giving one month refill in non controlled medications is strongly recommended before denial)    If refill has been denied, meaning absolutely no refills without visit, please complete the smart phrase \".SMIRXREFUSE\" and route it to the \"P [facility] MED REFILLS\"  pool to inform the patient and the pharmacy.    Rosie Nathan MA     "

## 2025-05-20 ENCOUNTER — THERAPY VISIT (OUTPATIENT)
Dept: PHYSICAL THERAPY | Facility: CLINIC | Age: 65
End: 2025-05-20
Payer: MEDICARE

## 2025-05-20 ENCOUNTER — THERAPY VISIT (OUTPATIENT)
Dept: OCCUPATIONAL THERAPY | Facility: CLINIC | Age: 65
End: 2025-05-20
Payer: MEDICARE

## 2025-05-20 DIAGNOSIS — R53.81 PHYSICAL DECONDITIONING: Primary | ICD-10-CM

## 2025-05-20 DIAGNOSIS — Z78.9 DEFICITS IN ACTIVITIES OF DAILY LIVING: ICD-10-CM

## 2025-05-20 DIAGNOSIS — Z74.09 MOBILITY IMPAIRED: ICD-10-CM

## 2025-05-20 DIAGNOSIS — Z74.09 MOBILITY IMPAIRED: Primary | ICD-10-CM

## 2025-05-20 DIAGNOSIS — R53.81 PHYSICAL DECONDITIONING: ICD-10-CM

## 2025-05-20 NOTE — PROGRESS NOTES
PHYSICAL THERAPY EVALUATION  Type of Visit: Evaluation       Fall Risk Screen:  Have you fallen 2 or more times in the past year?: Yes  Have you fallen and had an injury in the past year?: Yes  Is patient receiving Physical Therapy Services?: Yes    Subjective         Presenting condition or subjective complaint:   Had a couple falls earlier this year. Last time she fell was in the winter. She did have bruising from the falls, but no serious injuries.   Falls have occurred both outside and in her apartment. Sister reports that the falls occurred likely because of having to walk a long way. When she walks very far her legs get shaky and then she falls. They are hoping to get a scooter for her to make mobility safer.   Due to falls she has had in her apartment, she now always has someone present to assist as needed.     Date of onset: 02/21/25    Relevant medical history:   mild cognitive impairment, scoliosis  Dates & types of surgery:      Prior diagnostic imaging/testing results:       Prior therapy history for the same diagnosis, illness or injury:     Prior PT a couple years ago; per patient's sister, she had no improvement with the PT    Prior Level of Function  Transfers: Independent  Ambulation: Independent  ADL: Independent  IADL: Assistance with laundry, cleaning, meals, transportation    Living Environment  Social support:   lives alone, but has caregiver or her sister present at all times.  Type of home:   apartment  Stairs to enter the home:       none  Ramp:     Stairs inside the home:       none  Help at home:   Has county assistance, per sister; gets assistance with cleaning, meals, transportation/errands. Sister also assists as needed.   Equipment owned:       Employment:      Hobbies/Interests:      Patient goals for therapy:   to get a power scooter to make  mobility safer    Pain assessment: Pain denied     Objective   Vitals Signs  Heart Rate: 73  Blood Pressure: 93/58    Cognitive Status  Examination  Orientation: Person   Level of Consciousness: Alert  Follows Commands and Answers Questions: 100% of the time with assistance of her sister   Personal Safety and Judgement: Intact during evaluation  Memory: Impaired; sister assisted with providing history    OBSERVATION: Patient arrives with her sister, May. May declined  and prefers to act as patient's  today.  INTEGUMENTARY: Intact  POSTURE: Slight forward head posture  PALPATION: N/T  RANGE OF MOTION: LE ROM WFL  STRENGTH: LE strength 4+/5 throughout    BED MOBILITY: Not assessed    TRANSFERS: Independent    GAIT:   Level of [place]: SBA  Assistive Device(s): None  Gait Deviations: Patient ambulates with short steps, slow cristino, and low foot clearance, shuffling her feet.   Gait Distance: 200 feet  Stairs: Not assessed     BALANCE: Score on mCTSIB indicate patient is a fall risk. LOB on conditions 2 and 5 of the mCTSIB indicate visual dependence for balance.     SPECIAL TESTS  10 Meter Walk Test (Comfortable)  15.19 sec using no AD   5 Times Sit-to-Stand (5TSTS)  24.97 sec; retro LOB with 1st rep     Modified CTSIB Conditions (sec) Cond 1: 30  Cond 2: 25  Cond 4: 30  Cond 5 : 3       SENSATION: Describes numbness in thighs and knees      Assessment & Plan   CLINICAL IMPRESSIONS  Medical Diagnosis: Mobility impaired,  Physical deconditioning    Treatment Diagnosis: Force Production Deficit   Impression/Assessment: Patient is a 65 year old female with complaints of weakness and h/o falls. She presents with decreased activity tolerance, LE weakness, and impaired balance. Did suggest trial of a walker today, however patient and her sister declined this. They are hoping to pursue a power scooter. If she does qualify for a power scooter, discussed the importance of continuing to exercise and work on improving strength, to ensure safety for standing, transferring, and continuing to walk short distances.  Recommended continued  PT, however she prefers to continue independent HEP and return as needed. Will hold chart for 3 months to allow for follow up as needed.     Clinical Decision Making (Complexity):  Clinical Presentation: Stable/Uncomplicated  Clinical Presentation Rationale: based on medical and personal factors listed in PT evaluation  Clinical Decision Making (Complexity): Low complexity    PLAN OF CARE  Treatment Interventions:  Interventions: Gait Training, Neuromuscular Re-education, Therapeutic Activity, Therapeutic Exercise, Self-Care/Home Management, Standardized Testing    Long Term Goals     PT Goal 1  Goal Identifier: HEP  Goal Description: Patient will be independent with Home Exercise Program for continued improvements in health and wellness upon d/c from therapy  Goal Progress: 5/20: Patient was able to demonstrate good technique with exercises with cues and demonstration today in clinic  Target Date: 08/17/25      Frequency of Treatment: 3 visits  Duration of Treatment: over 90 days    Recommended Referrals to Other Professionals:   Education Assessment:   Learner/Method: Patient;Family;Listening;Demonstration;Pictures/Video  Education Comments: Educated patient/caregiver on the importance of continuing to work on improving LE functional strength for safe transfers, standing, walking, and ADLs. Educated on benefits of a walker, however patient/caregiver declined trial today.    Risks and benefits of evaluation/treatment have been explained.   Patient/Family/caregiver agrees with Plan of Care.     Evaluation Time:     PT Eval, Low Complexity Minutes (27241): 38     Signing Clinician: Marta Clement, PT        [facility]                                                                                   OUTPATIENT PHYSICAL THERAPY      PLAN OF TREATMENT FOR OUTPATIENT REHABILITATION   Patient's Last Name, First Name, Bridgette Tavarez    YOB: 1960   Provider's Name   [facility]  [facility]   Medical Record No.  [id number]     Onset Date: 02/21/25  Start of Care Date: 05/20/25     Medical Diagnosis:  Mobility impaired,  Physical deconditioning      PT Treatment Diagnosis:  Force Production Deficit Plan of Treatment  Frequency/Duration: 3 visits/ over 90 days    Certification date from 05/20/25 to 08/17/25         See note for plan of treatment details and functional goals     Marta Clement, PT                         I CERTIFY THE NEED FOR THESE SERVICES FURNISHED UNDER        THIS PLAN OF TREATMENT AND WHILE UNDER MY CARE     (Physician attestation of this document indicates review and certification of the therapy plan).              Referring Provider:  Referred Self    Initial Assessment  See Epic Evaluation- Start of Care Date: 05/20/25

## 2025-05-20 NOTE — PROGRESS NOTES
OCCUPATIONAL THERAPY EVALUATION  Type of Visit: Evaluation       Fall Risk Screen:  Have you fallen 2 or more times in the past year?: Yes  Have you fallen and had an injury in the past year?: Yes  Is patient receiving Physical Therapy Services?: Yes    Subjective        Presenting condition or subjective complaint:  would like   Date of onset: 05/20/25    Relevant medical history:     Dates & types of surgery:      Prior diagnostic imaging/testing results:       Prior therapy history for the same diagnosis, illness or injury:        Prior Level of Function  Transfers: Assistive equipment, Assistive person, variable  Ambulation: Independent, Assistive equipment  ADL: Assistive person  IADL: Dependent    Living Environment  Social support: alone ,   Type of home: apartment     Stairs to enter the home:         Ramp:     Stairs inside the home:         Help at home:  staff comes to help during the day and sister is there often into the evening  Equipment owned:   cane    Employment:       Hobbies/Interests:      Patient goals for therapy:  build strength     Pain assessment: Pain present  See objective evaluation for additional pain details     Objective   Cognitive Status Examination  Orientation:    Level of Consciousness: Alert, pleasant  Follows Commands and Answers Questions: Follows single step instructions, sister aided as historian throughout session with pt responding minimally   Personal Safety and Judgement: Impaired  Memory: Impaired  Attention: No deficits identified during OTE   Organization/Problem Solving: impaired   Executive Function: impaired    VISUAL SKILLS  Visual Acuity:   Visual Field:   Visual Attention:   Oculomotor:     SENSATION: pt reports some tiredness and numbness in L shoulder that is alleviated with massage, for last 2 weeks since she bumped it      POSTURE: WFL (seated)   RANGE OF MOTION: UE ROM WFL with exception of limited L shoulder flexion and abduction above 90 degrees due to  pain   STRENGTH:  (R/L) Shoulder flexion 4/5, 3/5; shoulder abduction 4/5, 4/4; elbow flexion 5/5, 5/5; elbow extension 3/5, 3/5 (directions?); functional  WFL     : Muscle testing through the use of isokinetic dynamometry is considered to be the gold standard approach for the assessment of muscle strength.  Patient participated in assessment of hand strength testing today.  R: 12.5 lbs  L: 7.3 lbs  WNL is a score 2 standard deviations below the mean or higher as compared to age and gender norms. R => 30.2 lbs, L => 24.6 lbs    MUSCLE TONE: WFL  COORDINATION: WNL - fine motor   BALANCE: assessed by PT later today    FUNCTIONAL MOBILITY  Assistive Device(s): Cane (single end) - uses in apartment   Ambulation: min A from sister today  Wheelchair:     BED MOBILITY: WFL - uses cane to stand, rails for turning in bed    TRANSFERS: WFL, Min Assist    BATHING: Mod Assist  Equipment:     UPPER BODY DRESSING: WFL - pt reports dressing her upper body I'ly   Equipment:     LOWER BODY DRESSING: Mod Assist  Equipment:     TOILETING: Mod Assist  Equipment:     GROOMING: Mod Assist  Equipment:     EATING/SELF FEEDING: Mod Assist   Equipment:     ACTIVITY TOLERANCE: impaired due to physical deconditioning and weakness     INSTRUMENTAL ACTIVITIES OF DAILY LIVING (IADL):   Meal Planning/Prep: Depenedent   Home/Financial Management: Dependent - sister  Communication/Computer Use: None  Community Mobility: Dependent  Care of Others:- N/A    Note: the exact level of assist was unclear today but per report, staff provides some assistance with ADLs and pt is dependent for IADLs      Assessment & Plan   CLINICAL IMPRESSIONS  Medical Diagnosis: Mobility impaired,  Physical deconditioning    Treatment Diagnosis: Deficits in activities of daily living    Impression/Assessment: Pt is a 65 year old female presenting to Occupational Therapy due to physical deconditioning and mobility impairments.  The following significant findings have  been identified: Impaired activity tolerance, Impaired cognition, Impaired ROM, and Impaired strength.  These identified deficits interfere with their ability to perform self care tasks, recreational activities, household mobility, and community mobility as compared to previous level of function.     Clinical Decision Making (Complexity):  Assessment of Occupational Performance: 3-5 Performance Deficits  Occupational Performance Limitations: bathing/showering, toileting, dressing, functional mobility, and leisure activities  Clinical Decision Making (Complexity): Moderate complexity    PLAN OF CARE  Treatment Interventions:  Interventions: Self-Care/Home Management, Therapeutic Activity, Therapeutic Exercise, Neuromuscular Re-education    Long Term Goals   OT Goal 1  Goal Identifier: HEP  Goal Description: By goal review date, pt will provide teach-back of all home exercises to demo accurate compliance and carryover of HEP that assists with increased participation in daily ADLs.  Rationale: In order to maximize safety and independence with performance of self-care activities  Goal Progress: HEP initiated today  Target Date: 08/17/25  OT Goal 2  Goal Identifier: Gross UE Strength  Goal Description: By goal review date, pt will demo improved strength in her upper extremity evidenced by MMT WFL  Rationale: In order to maximize safety and independence with performance of self-care activities  Goal Progress: HEP initiated today  Target Date: 08/17/25  OT Goal 3  Goal Identifier:  Strength  Goal Description: By goal review date, pt will demo improved  strength to WFL using the Dynamometer  Rationale: In order to maximize safety and independence with performance of self-care activities  Target Date: 08/17/25      Frequency of Treatment: 1x/wk with tapered frequency as clinically appropriate  Duration of Treatment: 90 days     Recommended Referrals to Other Professionals:   Education Assessment: Learner/Method:  Patient;Family;Listening;Reading;Demonstration;Pictures/Video     Risks and benefits of evaluation/treatment have been explained.   Patient/Family/caregiver agrees with Plan of Care.     Evaluation Time:    OT Eval, Moderate Complexity Minutes (91452): 30      Signing Clinician: MICHAEL Gale [facility]                                                                                   OUTPATIENT OCCUPATIONAL THERAPY      PLAN OF TREATMENT FOR OUTPATIENT REHABILITATION   Patient's Last Name, First Name, Bridgette Tavarez    YOB: 1960   Provider's Name   [facility]   Medical Record No.  [id number]     Onset Date: 05/20/25 Start of Care Date:  5/20/25     Medical Diagnosis:  Mobility impaired,  Physical deconditioning      OT Treatment Diagnosis:  Deficits in activities of daily living Plan of Treatment  Frequency/Duration:1x/wk with tapered frequency as clinically appropriate/90 days    Certification date from 5/20/25    To     8/17/25     See note for plan of treatment details and functional goals     MICHAEL Gale                         I CERTIFY THE NEED FOR THESE SERVICES FURNISHED UNDER        THIS PLAN OF TREATMENT AND WHILE UNDER MY CARE     (Physician attestation of this document indicates review and certification of the therapy plan).              Referring Provider:  Referred Self    Initial Assessment  See Epic Evaluation-

## 2025-05-27 ENCOUNTER — THERAPY VISIT (OUTPATIENT)
Dept: OCCUPATIONAL THERAPY | Facility: CLINIC | Age: 65
End: 2025-05-27
Payer: MEDICARE

## 2025-05-27 DIAGNOSIS — R53.81 PHYSICAL DECONDITIONING: Primary | ICD-10-CM

## 2025-05-27 DIAGNOSIS — Z74.09 MOBILITY IMPAIRED: ICD-10-CM

## 2025-05-27 DIAGNOSIS — Z78.9 DEFICITS IN ACTIVITIES OF DAILY LIVING: ICD-10-CM

## 2025-06-10 ENCOUNTER — THERAPY VISIT (OUTPATIENT)
Dept: OCCUPATIONAL THERAPY | Facility: CLINIC | Age: 65
End: 2025-06-10
Payer: MEDICARE

## 2025-06-10 DIAGNOSIS — R53.81 PHYSICAL DECONDITIONING: Primary | ICD-10-CM

## 2025-06-10 DIAGNOSIS — Z78.9 DEFICITS IN ACTIVITIES OF DAILY LIVING: ICD-10-CM

## 2025-06-10 DIAGNOSIS — Z74.09 MOBILITY IMPAIRED: ICD-10-CM

## 2025-06-17 ENCOUNTER — THERAPY VISIT (OUTPATIENT)
Dept: OCCUPATIONAL THERAPY | Facility: CLINIC | Age: 65
End: 2025-06-17
Payer: MEDICARE

## 2025-06-17 ENCOUNTER — TELEPHONE (OUTPATIENT)
Dept: OCCUPATIONAL THERAPY | Facility: CLINIC | Age: 65
End: 2025-06-17

## 2025-06-17 DIAGNOSIS — Z74.09 MOBILITY IMPAIRED: ICD-10-CM

## 2025-06-17 DIAGNOSIS — Z78.9 DEFICITS IN ACTIVITIES OF DAILY LIVING: ICD-10-CM

## 2025-06-17 DIAGNOSIS — R53.81 PHYSICAL DECONDITIONING: Primary | ICD-10-CM

## 2025-06-19 ENCOUNTER — THERAPY VISIT (OUTPATIENT)
Dept: OCCUPATIONAL THERAPY | Facility: CLINIC | Age: 65
End: 2025-06-19
Attending: FAMILY MEDICINE
Payer: MEDICARE

## 2025-06-19 DIAGNOSIS — Z74.09 MOBILITY IMPAIRED: ICD-10-CM

## 2025-06-19 DIAGNOSIS — Z78.9 IMPAIRED MOBILITY AND ADLS: Primary | ICD-10-CM

## 2025-06-19 DIAGNOSIS — Z74.09 IMPAIRED MOBILITY AND ADLS: Primary | ICD-10-CM

## 2025-06-19 DIAGNOSIS — H44.30: ICD-10-CM

## 2025-06-19 PROCEDURE — 97542 WHEELCHAIR MNGMENT TRAINING: CPT | Mod: GO | Performed by: OCCUPATIONAL THERAPIST

## 2025-06-19 NOTE — PROGRESS NOTES
"                                                                             SEATING AND WHEELED MOBILITY ASSESSMENT    [facility]  Occupational Therapy   Date of service: June 19, 2025    Referring provider: Nolvia Castillo  Order Date 2/21/25  Onset Date: 2/21/25    Order Details: [email or web address] [email or web address]    Funding:Medicare [employer]     present: Yes  Language: Video cantonese    Others present at visit:  sister    Vendor: [employer]    Height/Weight: 5'1\" / 86    Medical diagnosis:   Neurology/Sleep  Insomnia     FEN/Gastrointestinal  Underweight     Obstetrics/Gynecology  Osteopenia     Dermatology  Rash and nonspecific skin eruption  Dry skin     Orthopedic/Musculoskeletal  Scoliosis     Eye  Degenerative disorder of globe  Nuclear Senile Cataract     Other  Latent tuberculosis  Jeremiah as Reviewed    Patient concerns/goals: scooter    Living environment:  Apartment    Living environment barriers:  None      Current assistance/living environment:  Lives alone  Requires partial assistance    Community Mobility:  Transportation: Car, with sister  Community Mobility Requirements: Medical Appointments, Shopping      Current mobility equipment:  None- wall walks or hold sister    Fall Risk Screen:   Has the patient fallen 2 or more times in the last year? Yes      Has the patient fallen and had an injury in the past year? No       25 ft walk: 27.68 s for 25 ft with HHA from sister    Is the patient a fall risk? Yes, department fall risk interventions implemented    BED MOBILITY: WFL - uses cane to stand, rails for turning in bed     TRANSFERS: WFL, Min Assist     BATHING: Mod Assist  Equipment:      UPPER BODY DRESSING: WFL - pt reports dressing her upper body I'ly but slow  Equipment:     LOWER BODY DRESSING: Mod Assist  Equipment:      TOILETING: Mod Assist  Equipment:      GROOMING: Mod Assist  Equipment:     EATING/SELF FEEDING: Mod Assist   Equipment:      ACTIVITY TOLERANCE: " severe impairment due to physical deconditioning and weakness     Services:   PCA: 4-6 hours a week, sister 4x a week    Evaluation     Pain assessment:  Reports pain in legs making them hard to use  L shoulder from     Cognition:  appear normal but deficits in communication and language barrier  Balance:  Unsupported Sitting Balance: WFL  Sitting Balance in Chair: WFL  Standing Balance: Uses UE for Balance    POSTURE: WFL (seated)     RANGE OF MOTION: UE ROM WFL with exception of limited L shoulder flexion and abduction above 90 degrees due to pain     STRENGTH:  (R/L) Shoulder flexion 4/5, 3/5; shoulder abduction 4/5, 4/4; elbow flexion 5/5, 5/5; elbow extension 3/5, 3/5 (directions?); functional  WFL      : Muscle testing through the use of isokinetic dynamometry is considered to be the gold standard approach for the assessment of muscle strength.  Patient participated in assessment of hand strength testing today.  R: 12.5 lbs  L: 7.3 lbs  WNL is a score 2 standard deviations below the mean or higher as compared to age and gender norms. R => 30.2 lbs, L => 24.6 lbs     MUSCLE TONE: WFL    COORDINATION: WNL - fine motor     GAIT:   Level of [place]: SBA  Assistive Device(s): None  Gait Deviations: Patient ambulates with short steps, slow cristino, and low foot clearance, shuffling her feet.   Gait Distance: 200 feet  Stairs: Not assessed             BALANCE: Score on mCTSIB indicate patient is a fall risk. LOB on conditions 2 and 5 of the mCTSIB indicate visual dependence for balance.      SPECIAL TESTS  10 Meter Walk Test (Comfortable)  15.19 sec using no AD   5 Times Sit-to-Stand (5TSTS)  24.97 sec; retro LOB with 1st rep      Modified CTSIB Conditions (sec) Cond 1: 30  Cond 2: 25  Cond 4: 30  Cond 5 : 3         SENSATION: Describes numbness in thighs and knees     Impairments:  Fatigue  Muscle atrophy  Coordination  Balance  Pain     Treatment diagnosis:  Impaired mobility  Impaired activities of  daily living     Recommendations/Plan of care:  Patient would benefit from interventions to enhance safety and independence.  Rehab potential good for stated goals.  Occupational therapy intervention for  wheelchair management.    Goals:   Target date:   Patient, family and/or caregiver will verbalize/demonstrate understanding of compensatory methods /equipment to enhance functional independence and safety.    Educational assessment/barriers to learning:  Cognitive  Behavioral  Cultural/spiritual  Language    Treatment provided this date:   Wheelchair management, 45 minutes   Determined need for scooter due to very limited endurance and balance from LE pain and weakness. Safe scooter trial today.    Telerivet 3 wheel  Power Operated Vehicle / Scooter -  This device is being requested for this patient with mobility impairments to allow her to be able to complete all of her mobility related activities of daily living in a safe fashion, without risk of injury from falling, and in a reasonable time frame. she demonstrated during the home evaluation that she was able to transfer to/from the requested scooter, operate the tiller steering system, able to maintain postural stability and position while operating the POV, and operate the on/off mechanism and the speed dial appropriately and safely. They are very willing and physically / cognitively able to use the recommended equipment to assist with mobility related activities of daily living and general mobility. There is a mobility limitation that cannot be sufficiently and safely resolved by the use of an appropriately fitted cane or walker and they do not have sufficient upper extremity function to self-propel an optimally-configured manual wheelchair in their home to perform MRADLs during a typical day due to limitations in strength, endurance, range of motion, and coordination. This equipment is reasonable and necessary with reference to accepted standards of medical  practice and treatment of this patient's condition and is not being recommended as a convenience item. Without this recommended equipment, she is highly likely to sustain injuries from falls, develop pressure sores or postural compensation, and/or be bed confined, which those costs far exceed the cost of the requested equipment.    This therapist has no financial connection to the equipment being requested or vendor being used.    I have read and concur with the above recommendations.  Physician Printed Name __________________________________________  Physician Signature  _____________________________________________  Date of Signature ______________________________  Physician Phone  ______________________________      Response to treatment/recommendations: understanding    Goal attainment:  All goals met    Risks and benefits of evaluation/treatment have been explained.  Patient, family and/or caregiver are in agreement with Plan of Care.     Timed Code Treatment Minutes: 45  Total Treatment Time (sum of timed and untimed services): 45    Electronically signed by:  Saida LINDER, ATP      Occupational Therapist, Assistive   [phone]      fax: [phone]      [email or web address], [place] [address]  June 19, 2025

## 2025-06-24 ENCOUNTER — DOCUMENTATION ONLY (OUTPATIENT)
Dept: FAMILY MEDICINE | Facility: CLINIC | Age: 65
End: 2025-06-24

## 2025-06-24 NOTE — PROGRESS NOTES
"When opening a documentation only encounter, be sure to enter in \"Chief Complaint\" Forms and in \" Comments\" Title of form, description if needed.    Bridgette is a 65 year old  female  Form received via: Fax  Form now resides in: Provider Olivia Orr CMA               "

## 2025-07-10 ENCOUNTER — DOCUMENTATION ONLY (OUTPATIENT)
Dept: FAMILY MEDICINE | Facility: CLINIC | Age: 65
End: 2025-07-10
Payer: MEDICARE

## 2025-07-23 ENCOUNTER — DOCUMENTATION ONLY (OUTPATIENT)
Dept: FAMILY MEDICINE | Facility: CLINIC | Age: 65
End: 2025-07-23
Payer: MEDICARE

## 2025-07-25 ENCOUNTER — OFFICE VISIT (OUTPATIENT)
Dept: FAMILY MEDICINE | Facility: CLINIC | Age: 65
End: 2025-07-25
Payer: MEDICARE

## 2025-07-25 VITALS
WEIGHT: 91 LBS | TEMPERATURE: 97.6 F | SYSTOLIC BLOOD PRESSURE: 102 MMHG | BODY MASS INDEX: 17.18 KG/M2 | HEIGHT: 61 IN | DIASTOLIC BLOOD PRESSURE: 69 MMHG | OXYGEN SATURATION: 98 % | HEART RATE: 73 BPM | RESPIRATION RATE: 16 BRPM

## 2025-07-25 DIAGNOSIS — G47.00 INSOMNIA, UNSPECIFIED TYPE: ICD-10-CM

## 2025-07-25 DIAGNOSIS — L81.9 ATYPICAL PIGMENTED LESION: Primary | ICD-10-CM

## 2025-07-25 DIAGNOSIS — D22.9: ICD-10-CM

## 2025-07-25 DIAGNOSIS — K64.4 EXTERNAL HEMORRHOIDS: ICD-10-CM

## 2025-07-25 DIAGNOSIS — H04.123 DRY EYES: ICD-10-CM

## 2025-07-25 PROCEDURE — 99214 OFFICE O/P EST MOD 30 MIN: CPT | Mod: GC

## 2025-07-25 PROCEDURE — 3078F DIAST BP <80 MM HG: CPT

## 2025-07-25 PROCEDURE — 1126F AMNT PAIN NOTED NONE PRSNT: CPT

## 2025-07-25 PROCEDURE — 3074F SYST BP LT 130 MM HG: CPT

## 2025-07-25 RX ORDER — TRAZODONE HYDROCHLORIDE 50 MG/1
50 TABLET ORAL
Qty: 30 TABLET | Refills: 11 | Status: SHIPPED | OUTPATIENT
Start: 2025-07-25

## 2025-07-25 RX ORDER — CARBOXYMETHYLCELLULOSE SODIUM 5 MG/ML
1 SOLUTION/ DROPS OPHTHALMIC DAILY PRN
Qty: 50 EACH | Refills: 5 | Status: SHIPPED | OUTPATIENT
Start: 2025-07-25

## 2025-07-25 RX ORDER — HYDROCORTISONE 25 MG/G
CREAM TOPICAL DAILY PRN
Qty: 28 G | Refills: 3 | Status: SHIPPED | OUTPATIENT
Start: 2025-07-25

## 2025-07-25 ASSESSMENT — PAIN SCALES - GENERAL: PAINLEVEL_OUTOF10: NO PAIN (0)

## 2025-07-25 NOTE — PROGRESS NOTES
Assessment & Plan     Atypical pigmented lesion  Atypical, hyperpigmented, slightly raised lesions on the right eyebrow and right side of nose. Appeared between 8 and 12 months ago. Treated in the past with vaseline OTC and antifungal cream, with relief of itching. Lesions continue to get larger. On exam, they have irregular borders and the lesion on her right eyebrow is vascularized. Given vascularization and family history of skin cancer (unknown type) in sister, will place priority referral to Dermatology for evaluation.   - Adult Dermatology  Referral    Atypical blue nevus  Blue nevus on right posterior surface of neck. Also appeared between 8 and 12 months ago. Lesion has been growing slowly. Regular borders. Patient and sister would like the spot removed. Internal referral placed [facility] for [facility].   - [facility] - [facility] Internal Referral    Insomnia, unspecified type  Requesting refill of regular home meds.   - traZODone (DESYREL) 50 MG tablet  Dispense: 30 tablet; Refill: 11    External hemorrhoids  Requesting refill of regular home meds.   - hydrocortisone, Perianal, (PROCTO-MED HC) 2.5 % cream  Dispense: 28 g; Refill: 3    Dry eyes  Requesting refill of regular home meds.   - carboxymethylcellulose PF (CARBOXYMETHYLCELLULOSE SODIUM) 0.5 % ophthalmic solution  Dispense: 50 each; Refill: 5    Follow-up   No follow-ups on file.    Stephanie Bryan, MS3    Resident/Fellow Attestation   I, Bob Ventura MD, was present with the medical/MOLLY student who participated in the service and in the documentation of the note.  I have verified the history and personally performed the physical exam and medical decision making.  I agree with the assessment and plan of care as documented in the note.      Bob Ventura MD  PGY3      Esdras Angeles is a 65 year old, presenting for the following health issues:  Derm Problem (On the back and right eyebrow. Sometimes bleeding. She  "is picking at it and its itchy - )    HPI      Skin concern  Small, raised, dark, irregular color, itchy  Cannot scrape off  Started 8 months ago, getting bigger   Hurts when you touch it   R eyelid, R side of nose  Used vaseline, didn't go away or help the itching   Tried antifungal BID x 1 month, still growing and still itchy  R back of neck - blue-mark larger lesion, not itchy  No fever, illness   Older sister diagnosed with skin cancer, had similar lesion on the tip of her nose    Wants refill of trazodone, eyedrops, and hemorrhoid cream        Objective    /69   Pulse 73   Temp 97.6  F (36.4  C) (Skin)   Resp (!) 98   Ht 1.549 m (5' 1\")   Wt 41.3 kg (91 lb)   SpO2 98%   BMI 17.19 kg/m    Body mass index is 17.19 kg/m .  Physical Exam  Vitals reviewed.   Constitutional:       General: She is not in acute distress.  HENT:      Head:     Cardiovascular:      Rate and Rhythm: Normal rate and regular rhythm.   Pulmonary:      Effort: Pulmonary effort is normal.   Musculoskeletal:      Cervical back: Normal range of motion.   Skin:     Comments: See images below   Neurological:      Mental Status: She is alert. Mental status is at baseline.                  Signed Electronically by: Bob Ventura MD  "

## 2025-07-28 ENCOUNTER — TELEPHONE (OUTPATIENT)
Dept: DERMATOLOGY | Facility: CLINIC | Age: 65
End: 2025-07-28
Payer: MEDICARE

## 2025-07-28 NOTE — TELEPHONE ENCOUNTER
This encounter is being sent to inform the clinic that this patient has a referral from [facility] for the diagnoses of L81.9 (ICD-10-CM) - Atypical pigmented lesion and has requested that this patient be seen within 1-2 weeks  and/or with [facility].  Based on the availability of our provider(s), we are unable to accommodate this request.    Were all sites offered this patient?  Yes    Does scheduling algorithm request to schedule next available?  Patient has been scheduled for the first available opening with Alyson White on 3/9.  We have informed the patient that the clinic will review their referral and reach out if a sooner appointment is medically necessary.

## 2025-07-29 NOTE — TELEPHONE ENCOUNTER
7/29 Patient confirmed scheduled appointment:  Date: 9/9/2025   Time: 1:00 PM  Visit type: New Dermatology  Provider: Juan  Location: [facility]  Testing/imaging: na  Additional notes: per Kayrene

## 2025-08-21 ENCOUNTER — OFFICE VISIT (OUTPATIENT)
Dept: FAMILY MEDICINE | Facility: CLINIC | Age: 65
End: 2025-08-21
Payer: MEDICARE

## 2025-08-21 VITALS
SYSTOLIC BLOOD PRESSURE: 94 MMHG | HEIGHT: 61 IN | WEIGHT: 88.9 LBS | BODY MASS INDEX: 16.78 KG/M2 | DIASTOLIC BLOOD PRESSURE: 61 MMHG | OXYGEN SATURATION: 96 % | RESPIRATION RATE: 16 BRPM | TEMPERATURE: 97.9 F | HEART RATE: 73 BPM

## 2025-08-21 DIAGNOSIS — L72.3 SEBACEOUS CYST: Primary | ICD-10-CM

## 2025-08-21 ASSESSMENT — PAIN SCALES - GENERAL: PAINLEVEL_OUTOF10: NO PAIN (0)
